# Patient Record
Sex: FEMALE | Race: OTHER | HISPANIC OR LATINO | URBAN - METROPOLITAN AREA
[De-identification: names, ages, dates, MRNs, and addresses within clinical notes are randomized per-mention and may not be internally consistent; named-entity substitution may affect disease eponyms.]

---

## 2023-10-17 VITALS
TEMPERATURE: 99 F | HEIGHT: 59 IN | DIASTOLIC BLOOD PRESSURE: 95 MMHG | WEIGHT: 123.9 LBS | RESPIRATION RATE: 18 BRPM | SYSTOLIC BLOOD PRESSURE: 183 MMHG | HEART RATE: 83 BPM | OXYGEN SATURATION: 100 %

## 2023-10-17 LAB
ALBUMIN SERPL ELPH-MCNC: 4.2 G/DL — SIGNIFICANT CHANGE UP (ref 3.3–5)
ALBUMIN SERPL ELPH-MCNC: 4.2 G/DL — SIGNIFICANT CHANGE UP (ref 3.3–5)
ALP SERPL-CCNC: 85 U/L — SIGNIFICANT CHANGE UP (ref 40–120)
ALP SERPL-CCNC: 85 U/L — SIGNIFICANT CHANGE UP (ref 40–120)
ALT FLD-CCNC: 14 U/L — SIGNIFICANT CHANGE UP (ref 10–45)
ALT FLD-CCNC: 14 U/L — SIGNIFICANT CHANGE UP (ref 10–45)
ANION GAP SERPL CALC-SCNC: 10 MMOL/L — SIGNIFICANT CHANGE UP (ref 5–17)
ANION GAP SERPL CALC-SCNC: 10 MMOL/L — SIGNIFICANT CHANGE UP (ref 5–17)
APTT BLD: 29.4 SEC — SIGNIFICANT CHANGE UP (ref 24.5–35.6)
APTT BLD: 29.4 SEC — SIGNIFICANT CHANGE UP (ref 24.5–35.6)
AST SERPL-CCNC: 22 U/L — SIGNIFICANT CHANGE UP (ref 10–40)
AST SERPL-CCNC: 22 U/L — SIGNIFICANT CHANGE UP (ref 10–40)
BASOPHILS # BLD AUTO: 0.03 K/UL — SIGNIFICANT CHANGE UP (ref 0–0.2)
BASOPHILS # BLD AUTO: 0.03 K/UL — SIGNIFICANT CHANGE UP (ref 0–0.2)
BASOPHILS NFR BLD AUTO: 0.5 % — SIGNIFICANT CHANGE UP (ref 0–2)
BASOPHILS NFR BLD AUTO: 0.5 % — SIGNIFICANT CHANGE UP (ref 0–2)
BILIRUB SERPL-MCNC: 0.2 MG/DL — SIGNIFICANT CHANGE UP (ref 0.2–1.2)
BILIRUB SERPL-MCNC: 0.2 MG/DL — SIGNIFICANT CHANGE UP (ref 0.2–1.2)
BUN SERPL-MCNC: 18 MG/DL — SIGNIFICANT CHANGE UP (ref 7–23)
BUN SERPL-MCNC: 18 MG/DL — SIGNIFICANT CHANGE UP (ref 7–23)
CALCIUM SERPL-MCNC: 9.6 MG/DL — SIGNIFICANT CHANGE UP (ref 8.4–10.5)
CALCIUM SERPL-MCNC: 9.6 MG/DL — SIGNIFICANT CHANGE UP (ref 8.4–10.5)
CHLORIDE SERPL-SCNC: 105 MMOL/L — SIGNIFICANT CHANGE UP (ref 96–108)
CHLORIDE SERPL-SCNC: 105 MMOL/L — SIGNIFICANT CHANGE UP (ref 96–108)
CO2 SERPL-SCNC: 26 MMOL/L — SIGNIFICANT CHANGE UP (ref 22–31)
CO2 SERPL-SCNC: 26 MMOL/L — SIGNIFICANT CHANGE UP (ref 22–31)
CREAT SERPL-MCNC: 1.01 MG/DL — SIGNIFICANT CHANGE UP (ref 0.5–1.3)
CREAT SERPL-MCNC: 1.01 MG/DL — SIGNIFICANT CHANGE UP (ref 0.5–1.3)
EGFR: 56 ML/MIN/1.73M2 — LOW
EGFR: 56 ML/MIN/1.73M2 — LOW
EOSINOPHIL # BLD AUTO: 0.13 K/UL — SIGNIFICANT CHANGE UP (ref 0–0.5)
EOSINOPHIL # BLD AUTO: 0.13 K/UL — SIGNIFICANT CHANGE UP (ref 0–0.5)
EOSINOPHIL NFR BLD AUTO: 2.1 % — SIGNIFICANT CHANGE UP (ref 0–6)
EOSINOPHIL NFR BLD AUTO: 2.1 % — SIGNIFICANT CHANGE UP (ref 0–6)
GLUCOSE SERPL-MCNC: 186 MG/DL — HIGH (ref 70–99)
GLUCOSE SERPL-MCNC: 186 MG/DL — HIGH (ref 70–99)
HCT VFR BLD CALC: 33.7 % — LOW (ref 34.5–45)
HCT VFR BLD CALC: 33.7 % — LOW (ref 34.5–45)
HGB BLD-MCNC: 10.6 G/DL — LOW (ref 11.5–15.5)
HGB BLD-MCNC: 10.6 G/DL — LOW (ref 11.5–15.5)
IMM GRANULOCYTES NFR BLD AUTO: 0.3 % — SIGNIFICANT CHANGE UP (ref 0–0.9)
IMM GRANULOCYTES NFR BLD AUTO: 0.3 % — SIGNIFICANT CHANGE UP (ref 0–0.9)
INR BLD: 0.95 — SIGNIFICANT CHANGE UP (ref 0.85–1.18)
INR BLD: 0.95 — SIGNIFICANT CHANGE UP (ref 0.85–1.18)
LYMPHOCYTES # BLD AUTO: 2.32 K/UL — SIGNIFICANT CHANGE UP (ref 1–3.3)
LYMPHOCYTES # BLD AUTO: 2.32 K/UL — SIGNIFICANT CHANGE UP (ref 1–3.3)
LYMPHOCYTES # BLD AUTO: 36.9 % — SIGNIFICANT CHANGE UP (ref 13–44)
LYMPHOCYTES # BLD AUTO: 36.9 % — SIGNIFICANT CHANGE UP (ref 13–44)
MCHC RBC-ENTMCNC: 27 PG — SIGNIFICANT CHANGE UP (ref 27–34)
MCHC RBC-ENTMCNC: 27 PG — SIGNIFICANT CHANGE UP (ref 27–34)
MCHC RBC-ENTMCNC: 31.5 GM/DL — LOW (ref 32–36)
MCHC RBC-ENTMCNC: 31.5 GM/DL — LOW (ref 32–36)
MCV RBC AUTO: 85.8 FL — SIGNIFICANT CHANGE UP (ref 80–100)
MCV RBC AUTO: 85.8 FL — SIGNIFICANT CHANGE UP (ref 80–100)
MONOCYTES # BLD AUTO: 0.47 K/UL — SIGNIFICANT CHANGE UP (ref 0–0.9)
MONOCYTES # BLD AUTO: 0.47 K/UL — SIGNIFICANT CHANGE UP (ref 0–0.9)
MONOCYTES NFR BLD AUTO: 7.5 % — SIGNIFICANT CHANGE UP (ref 2–14)
MONOCYTES NFR BLD AUTO: 7.5 % — SIGNIFICANT CHANGE UP (ref 2–14)
NEUTROPHILS # BLD AUTO: 3.32 K/UL — SIGNIFICANT CHANGE UP (ref 1.8–7.4)
NEUTROPHILS # BLD AUTO: 3.32 K/UL — SIGNIFICANT CHANGE UP (ref 1.8–7.4)
NEUTROPHILS NFR BLD AUTO: 52.7 % — SIGNIFICANT CHANGE UP (ref 43–77)
NEUTROPHILS NFR BLD AUTO: 52.7 % — SIGNIFICANT CHANGE UP (ref 43–77)
NRBC # BLD: 0 /100 WBCS — SIGNIFICANT CHANGE UP (ref 0–0)
NRBC # BLD: 0 /100 WBCS — SIGNIFICANT CHANGE UP (ref 0–0)
PLATELET # BLD AUTO: 233 K/UL — SIGNIFICANT CHANGE UP (ref 150–400)
PLATELET # BLD AUTO: 233 K/UL — SIGNIFICANT CHANGE UP (ref 150–400)
POTASSIUM SERPL-MCNC: 4.7 MMOL/L — SIGNIFICANT CHANGE UP (ref 3.5–5.3)
POTASSIUM SERPL-MCNC: 4.7 MMOL/L — SIGNIFICANT CHANGE UP (ref 3.5–5.3)
POTASSIUM SERPL-SCNC: 4.7 MMOL/L — SIGNIFICANT CHANGE UP (ref 3.5–5.3)
POTASSIUM SERPL-SCNC: 4.7 MMOL/L — SIGNIFICANT CHANGE UP (ref 3.5–5.3)
PROT SERPL-MCNC: 7.2 G/DL — SIGNIFICANT CHANGE UP (ref 6–8.3)
PROT SERPL-MCNC: 7.2 G/DL — SIGNIFICANT CHANGE UP (ref 6–8.3)
PROTHROM AB SERPL-ACNC: 10.9 SEC — SIGNIFICANT CHANGE UP (ref 9.5–13)
PROTHROM AB SERPL-ACNC: 10.9 SEC — SIGNIFICANT CHANGE UP (ref 9.5–13)
RBC # BLD: 3.93 M/UL — SIGNIFICANT CHANGE UP (ref 3.8–5.2)
RBC # BLD: 3.93 M/UL — SIGNIFICANT CHANGE UP (ref 3.8–5.2)
RBC # FLD: 14.3 % — SIGNIFICANT CHANGE UP (ref 10.3–14.5)
RBC # FLD: 14.3 % — SIGNIFICANT CHANGE UP (ref 10.3–14.5)
SODIUM SERPL-SCNC: 141 MMOL/L — SIGNIFICANT CHANGE UP (ref 135–145)
SODIUM SERPL-SCNC: 141 MMOL/L — SIGNIFICANT CHANGE UP (ref 135–145)
TROPONIN T, HIGH SENSITIVITY RESULT: 7 NG/L — SIGNIFICANT CHANGE UP (ref 0–51)
TROPONIN T, HIGH SENSITIVITY RESULT: 7 NG/L — SIGNIFICANT CHANGE UP (ref 0–51)
WBC # BLD: 6.29 K/UL — SIGNIFICANT CHANGE UP (ref 3.8–10.5)
WBC # BLD: 6.29 K/UL — SIGNIFICANT CHANGE UP (ref 3.8–10.5)
WBC # FLD AUTO: 6.29 K/UL — SIGNIFICANT CHANGE UP (ref 3.8–10.5)
WBC # FLD AUTO: 6.29 K/UL — SIGNIFICANT CHANGE UP (ref 3.8–10.5)

## 2023-10-17 PROCEDURE — 0042T: CPT | Mod: MA

## 2023-10-17 PROCEDURE — 93010 ELECTROCARDIOGRAM REPORT: CPT

## 2023-10-17 PROCEDURE — 70498 CT ANGIOGRAPHY NECK: CPT | Mod: 26,MA

## 2023-10-17 PROCEDURE — 70496 CT ANGIOGRAPHY HEAD: CPT | Mod: 26,MA

## 2023-10-17 PROCEDURE — 70450 CT HEAD/BRAIN W/O DYE: CPT | Mod: 26,MA,59

## 2023-10-17 PROCEDURE — 99285 EMERGENCY DEPT VISIT HI MDM: CPT | Mod: 25

## 2023-10-17 RX ORDER — CLOPIDOGREL BISULFATE 75 MG/1
75 TABLET, FILM COATED ORAL ONCE
Refills: 0 | Status: COMPLETED | OUTPATIENT
Start: 2023-10-17 | End: 2023-10-17

## 2023-10-17 RX ORDER — LABETALOL HCL 100 MG
10 TABLET ORAL ONCE
Refills: 0 | Status: COMPLETED | OUTPATIENT
Start: 2023-10-17 | End: 2023-10-17

## 2023-10-17 RX ORDER — ASPIRIN/CALCIUM CARB/MAGNESIUM 324 MG
81 TABLET ORAL ONCE
Refills: 0 | Status: COMPLETED | OUTPATIENT
Start: 2023-10-17 | End: 2023-10-17

## 2023-10-17 RX ADMIN — CLOPIDOGREL BISULFATE 75 MILLIGRAM(S): 75 TABLET, FILM COATED ORAL at 23:29

## 2023-10-17 RX ADMIN — Medication 10 MILLIGRAM(S): at 23:29

## 2023-10-17 RX ADMIN — Medication 81 MILLIGRAM(S): at 23:28

## 2023-10-17 NOTE — ED ADULT NURSE NOTE - OBJECTIVE STATEMENT
81 yo F c/o of L facial numbness. LWK 1830 on 10/17. Pt reports she was at home in the kitchen standing when her  asked their daughter to come down and check on her. Daughter noticed L sided facial droop. Pt also reports feeling dizzy on and off throughout the day. Pt denies any numbness upon ED arrival. Mild L facial droop noticed. Denies headache, dizziness, blurry vision, numbness or tingling of any of her extremities. R arm drift when NIHSS performed by provider. A&Ox4, steady while ambulatory

## 2023-10-18 ENCOUNTER — INPATIENT (INPATIENT)
Facility: HOSPITAL | Age: 83
LOS: 1 days | Discharge: HOME | DRG: 69 | End: 2023-10-20
Attending: PSYCHIATRY & NEUROLOGY | Admitting: PSYCHIATRY & NEUROLOGY
Payer: MEDICARE

## 2023-10-18 ENCOUNTER — TRANSCRIPTION ENCOUNTER (OUTPATIENT)
Age: 83
End: 2023-10-18

## 2023-10-18 DIAGNOSIS — Z95.2 PRESENCE OF PROSTHETIC HEART VALVE: Chronic | ICD-10-CM

## 2023-10-18 LAB
A1C WITH ESTIMATED AVERAGE GLUCOSE RESULT: 7.9 % — HIGH (ref 4–5.6)
A1C WITH ESTIMATED AVERAGE GLUCOSE RESULT: 7.9 % — HIGH (ref 4–5.6)
ANION GAP SERPL CALC-SCNC: 8 MMOL/L — SIGNIFICANT CHANGE UP (ref 5–17)
ANION GAP SERPL CALC-SCNC: 8 MMOL/L — SIGNIFICANT CHANGE UP (ref 5–17)
BUN SERPL-MCNC: 20 MG/DL — SIGNIFICANT CHANGE UP (ref 7–23)
BUN SERPL-MCNC: 20 MG/DL — SIGNIFICANT CHANGE UP (ref 7–23)
CALCIUM SERPL-MCNC: 9.6 MG/DL — SIGNIFICANT CHANGE UP (ref 8.4–10.5)
CALCIUM SERPL-MCNC: 9.6 MG/DL — SIGNIFICANT CHANGE UP (ref 8.4–10.5)
CHLORIDE SERPL-SCNC: 104 MMOL/L — SIGNIFICANT CHANGE UP (ref 96–108)
CHLORIDE SERPL-SCNC: 104 MMOL/L — SIGNIFICANT CHANGE UP (ref 96–108)
CHOLEST SERPL-MCNC: 136 MG/DL — SIGNIFICANT CHANGE UP
CHOLEST SERPL-MCNC: 136 MG/DL — SIGNIFICANT CHANGE UP
CO2 SERPL-SCNC: 27 MMOL/L — SIGNIFICANT CHANGE UP (ref 22–31)
CO2 SERPL-SCNC: 27 MMOL/L — SIGNIFICANT CHANGE UP (ref 22–31)
CREAT SERPL-MCNC: 0.89 MG/DL — SIGNIFICANT CHANGE UP (ref 0.5–1.3)
CREAT SERPL-MCNC: 0.89 MG/DL — SIGNIFICANT CHANGE UP (ref 0.5–1.3)
EGFR: 66 ML/MIN/1.73M2 — SIGNIFICANT CHANGE UP
EGFR: 66 ML/MIN/1.73M2 — SIGNIFICANT CHANGE UP
ESTIMATED AVERAGE GLUCOSE: 180 MG/DL — HIGH (ref 68–114)
ESTIMATED AVERAGE GLUCOSE: 180 MG/DL — HIGH (ref 68–114)
GLUCOSE BLDC GLUCOMTR-MCNC: 116 MG/DL — HIGH (ref 70–99)
GLUCOSE BLDC GLUCOMTR-MCNC: 116 MG/DL — HIGH (ref 70–99)
GLUCOSE BLDC GLUCOMTR-MCNC: 121 MG/DL — HIGH (ref 70–99)
GLUCOSE BLDC GLUCOMTR-MCNC: 121 MG/DL — HIGH (ref 70–99)
GLUCOSE BLDC GLUCOMTR-MCNC: 137 MG/DL — HIGH (ref 70–99)
GLUCOSE BLDC GLUCOMTR-MCNC: 137 MG/DL — HIGH (ref 70–99)
GLUCOSE BLDC GLUCOMTR-MCNC: 151 MG/DL — HIGH (ref 70–99)
GLUCOSE BLDC GLUCOMTR-MCNC: 151 MG/DL — HIGH (ref 70–99)
GLUCOSE BLDC GLUCOMTR-MCNC: 166 MG/DL — HIGH (ref 70–99)
GLUCOSE BLDC GLUCOMTR-MCNC: 166 MG/DL — HIGH (ref 70–99)
GLUCOSE BLDC GLUCOMTR-MCNC: 213 MG/DL — HIGH (ref 70–99)
GLUCOSE BLDC GLUCOMTR-MCNC: 213 MG/DL — HIGH (ref 70–99)
GLUCOSE BLDC GLUCOMTR-MCNC: 217 MG/DL — HIGH (ref 70–99)
GLUCOSE BLDC GLUCOMTR-MCNC: 217 MG/DL — HIGH (ref 70–99)
GLUCOSE SERPL-MCNC: 125 MG/DL — HIGH (ref 70–99)
GLUCOSE SERPL-MCNC: 125 MG/DL — HIGH (ref 70–99)
HCT VFR BLD CALC: 32.1 % — LOW (ref 34.5–45)
HCT VFR BLD CALC: 32.1 % — LOW (ref 34.5–45)
HDLC SERPL-MCNC: 62 MG/DL — SIGNIFICANT CHANGE UP
HDLC SERPL-MCNC: 62 MG/DL — SIGNIFICANT CHANGE UP
HGB BLD-MCNC: 10.1 G/DL — LOW (ref 11.5–15.5)
HGB BLD-MCNC: 10.1 G/DL — LOW (ref 11.5–15.5)
LIPID PNL WITH DIRECT LDL SERPL: 57 MG/DL — SIGNIFICANT CHANGE UP
LIPID PNL WITH DIRECT LDL SERPL: 57 MG/DL — SIGNIFICANT CHANGE UP
MAGNESIUM SERPL-MCNC: 1.5 MG/DL — LOW (ref 1.6–2.6)
MAGNESIUM SERPL-MCNC: 1.5 MG/DL — LOW (ref 1.6–2.6)
MCHC RBC-ENTMCNC: 26.6 PG — LOW (ref 27–34)
MCHC RBC-ENTMCNC: 26.6 PG — LOW (ref 27–34)
MCHC RBC-ENTMCNC: 31.5 GM/DL — LOW (ref 32–36)
MCHC RBC-ENTMCNC: 31.5 GM/DL — LOW (ref 32–36)
MCV RBC AUTO: 84.5 FL — SIGNIFICANT CHANGE UP (ref 80–100)
MCV RBC AUTO: 84.5 FL — SIGNIFICANT CHANGE UP (ref 80–100)
NON HDL CHOLESTEROL: 74 MG/DL — SIGNIFICANT CHANGE UP
NON HDL CHOLESTEROL: 74 MG/DL — SIGNIFICANT CHANGE UP
NRBC # BLD: 0 /100 WBCS — SIGNIFICANT CHANGE UP (ref 0–0)
NRBC # BLD: 0 /100 WBCS — SIGNIFICANT CHANGE UP (ref 0–0)
PHOSPHATE SERPL-MCNC: 3.9 MG/DL — SIGNIFICANT CHANGE UP (ref 2.5–4.5)
PHOSPHATE SERPL-MCNC: 3.9 MG/DL — SIGNIFICANT CHANGE UP (ref 2.5–4.5)
PLATELET # BLD AUTO: 209 K/UL — SIGNIFICANT CHANGE UP (ref 150–400)
PLATELET # BLD AUTO: 209 K/UL — SIGNIFICANT CHANGE UP (ref 150–400)
POTASSIUM SERPL-MCNC: 4.3 MMOL/L — SIGNIFICANT CHANGE UP (ref 3.5–5.3)
POTASSIUM SERPL-MCNC: 4.3 MMOL/L — SIGNIFICANT CHANGE UP (ref 3.5–5.3)
POTASSIUM SERPL-SCNC: 4.3 MMOL/L — SIGNIFICANT CHANGE UP (ref 3.5–5.3)
POTASSIUM SERPL-SCNC: 4.3 MMOL/L — SIGNIFICANT CHANGE UP (ref 3.5–5.3)
RBC # BLD: 3.8 M/UL — SIGNIFICANT CHANGE UP (ref 3.8–5.2)
RBC # BLD: 3.8 M/UL — SIGNIFICANT CHANGE UP (ref 3.8–5.2)
RBC # FLD: 14.4 % — SIGNIFICANT CHANGE UP (ref 10.3–14.5)
RBC # FLD: 14.4 % — SIGNIFICANT CHANGE UP (ref 10.3–14.5)
SODIUM SERPL-SCNC: 139 MMOL/L — SIGNIFICANT CHANGE UP (ref 135–145)
SODIUM SERPL-SCNC: 139 MMOL/L — SIGNIFICANT CHANGE UP (ref 135–145)
TRIGL SERPL-MCNC: 86 MG/DL — SIGNIFICANT CHANGE UP
TRIGL SERPL-MCNC: 86 MG/DL — SIGNIFICANT CHANGE UP
TSH SERPL-MCNC: 3.34 UIU/ML — SIGNIFICANT CHANGE UP (ref 0.27–4.2)
TSH SERPL-MCNC: 3.34 UIU/ML — SIGNIFICANT CHANGE UP (ref 0.27–4.2)
WBC # BLD: 6.43 K/UL — SIGNIFICANT CHANGE UP (ref 3.8–10.5)
WBC # BLD: 6.43 K/UL — SIGNIFICANT CHANGE UP (ref 3.8–10.5)
WBC # FLD AUTO: 6.43 K/UL — SIGNIFICANT CHANGE UP (ref 3.8–10.5)
WBC # FLD AUTO: 6.43 K/UL — SIGNIFICANT CHANGE UP (ref 3.8–10.5)

## 2023-10-18 PROCEDURE — 99223 1ST HOSP IP/OBS HIGH 75: CPT

## 2023-10-18 PROCEDURE — 71045 X-RAY EXAM CHEST 1 VIEW: CPT | Mod: 26

## 2023-10-18 PROCEDURE — 70450 CT HEAD/BRAIN W/O DYE: CPT | Mod: 26

## 2023-10-18 PROCEDURE — 99222 1ST HOSP IP/OBS MODERATE 55: CPT

## 2023-10-18 RX ORDER — METOPROLOL TARTRATE 50 MG
12.5 TABLET ORAL EVERY 12 HOURS
Refills: 0 | Status: DISCONTINUED | OUTPATIENT
Start: 2023-10-18 | End: 2023-10-19

## 2023-10-18 RX ORDER — ACETAMINOPHEN 500 MG
650 TABLET ORAL EVERY 6 HOURS
Refills: 0 | Status: DISCONTINUED | OUTPATIENT
Start: 2023-10-18 | End: 2023-10-19

## 2023-10-18 RX ORDER — INSULIN GLARGINE 100 [IU]/ML
10 INJECTION, SOLUTION SUBCUTANEOUS
Refills: 0 | DISCHARGE

## 2023-10-18 RX ORDER — DEXTROSE 50 % IN WATER 50 %
12.5 SYRINGE (ML) INTRAVENOUS ONCE
Refills: 0 | Status: DISCONTINUED | OUTPATIENT
Start: 2023-10-18 | End: 2023-10-20

## 2023-10-18 RX ORDER — ASPIRIN/CALCIUM CARB/MAGNESIUM 324 MG
81 TABLET ORAL DAILY
Refills: 0 | Status: DISCONTINUED | OUTPATIENT
Start: 2023-10-19 | End: 2023-10-20

## 2023-10-18 RX ORDER — INSULIN GLARGINE 100 [IU]/ML
5 INJECTION, SOLUTION SUBCUTANEOUS AT BEDTIME
Refills: 0 | Status: DISCONTINUED | OUTPATIENT
Start: 2023-10-18 | End: 2023-10-20

## 2023-10-18 RX ORDER — DEXTROSE 50 % IN WATER 50 %
25 SYRINGE (ML) INTRAVENOUS ONCE
Refills: 0 | Status: DISCONTINUED | OUTPATIENT
Start: 2023-10-18 | End: 2023-10-20

## 2023-10-18 RX ORDER — INSULIN GLARGINE 100 [IU]/ML
5 INJECTION, SOLUTION SUBCUTANEOUS ONCE
Refills: 0 | Status: COMPLETED | OUTPATIENT
Start: 2023-10-18 | End: 2023-10-18

## 2023-10-18 RX ORDER — SODIUM CHLORIDE 9 MG/ML
1000 INJECTION, SOLUTION INTRAVENOUS
Refills: 0 | Status: DISCONTINUED | OUTPATIENT
Start: 2023-10-18 | End: 2023-10-20

## 2023-10-18 RX ORDER — INSULIN LISPRO 100/ML
VIAL (ML) SUBCUTANEOUS
Refills: 0 | Status: DISCONTINUED | OUTPATIENT
Start: 2023-10-18 | End: 2023-10-20

## 2023-10-18 RX ORDER — DEXTROSE 50 % IN WATER 50 %
15 SYRINGE (ML) INTRAVENOUS ONCE
Refills: 0 | Status: DISCONTINUED | OUTPATIENT
Start: 2023-10-18 | End: 2023-10-20

## 2023-10-18 RX ORDER — ENOXAPARIN SODIUM 100 MG/ML
40 INJECTION SUBCUTANEOUS EVERY 24 HOURS
Refills: 0 | Status: DISCONTINUED | OUTPATIENT
Start: 2023-10-18 | End: 2023-10-20

## 2023-10-18 RX ORDER — GLUCAGON INJECTION, SOLUTION 0.5 MG/.1ML
1 INJECTION, SOLUTION SUBCUTANEOUS ONCE
Refills: 0 | Status: DISCONTINUED | OUTPATIENT
Start: 2023-10-18 | End: 2023-10-20

## 2023-10-18 RX ORDER — SITAGLIPTIN 50 MG/1
1 TABLET, FILM COATED ORAL
Refills: 0 | DISCHARGE

## 2023-10-18 RX ORDER — PANTOPRAZOLE SODIUM 20 MG/1
40 TABLET, DELAYED RELEASE ORAL
Refills: 0 | Status: DISCONTINUED | OUTPATIENT
Start: 2023-10-18 | End: 2023-10-20

## 2023-10-18 RX ORDER — ATORVASTATIN CALCIUM 80 MG/1
80 TABLET, FILM COATED ORAL AT BEDTIME
Refills: 0 | Status: DISCONTINUED | OUTPATIENT
Start: 2023-10-18 | End: 2023-10-20

## 2023-10-18 RX ORDER — METFORMIN HYDROCHLORIDE 850 MG/1
1 TABLET ORAL
Refills: 0 | DISCHARGE

## 2023-10-18 RX ORDER — CLOPIDOGREL BISULFATE 75 MG/1
75 TABLET, FILM COATED ORAL DAILY
Refills: 0 | Status: DISCONTINUED | OUTPATIENT
Start: 2023-10-19 | End: 2023-10-20

## 2023-10-18 RX ORDER — LISINOPRIL 2.5 MG/1
10 TABLET ORAL DAILY
Refills: 0 | Status: DISCONTINUED | OUTPATIENT
Start: 2023-10-18 | End: 2023-10-19

## 2023-10-18 RX ADMIN — Medication 4: at 17:38

## 2023-10-18 RX ADMIN — ENOXAPARIN SODIUM 40 MILLIGRAM(S): 100 INJECTION SUBCUTANEOUS at 17:23

## 2023-10-18 RX ADMIN — Medication 650 MILLIGRAM(S): at 23:40

## 2023-10-18 RX ADMIN — ATORVASTATIN CALCIUM 80 MILLIGRAM(S): 80 TABLET, FILM COATED ORAL at 22:42

## 2023-10-18 RX ADMIN — INSULIN GLARGINE 5 UNIT(S): 100 INJECTION, SOLUTION SUBCUTANEOUS at 23:24

## 2023-10-18 RX ADMIN — Medication 12.5 MILLIGRAM(S): at 07:27

## 2023-10-18 RX ADMIN — INSULIN GLARGINE 5 UNIT(S): 100 INJECTION, SOLUTION SUBCUTANEOUS at 03:56

## 2023-10-18 RX ADMIN — LISINOPRIL 10 MILLIGRAM(S): 2.5 TABLET ORAL at 11:59

## 2023-10-18 RX ADMIN — Medication 650 MILLIGRAM(S): at 22:42

## 2023-10-18 RX ADMIN — PANTOPRAZOLE SODIUM 40 MILLIGRAM(S): 20 TABLET, DELAYED RELEASE ORAL at 07:29

## 2023-10-18 RX ADMIN — Medication 12.5 MILLIGRAM(S): at 17:23

## 2023-10-18 RX ADMIN — Medication 2: at 23:23

## 2023-10-18 NOTE — ED ADULT NURSE REASSESSMENT NOTE - NS ED NURSE REASSESS COMMENT FT1
stroke team at bedside. made aware of BP. pending medication orders.
report received from night shift RN. Pt resting comfortably in stretcher, awake and alert, oriented x 4, respirations even and unlabored. continuous cardiac monitoring remains in place. denies facial numbness, weakness, dizziness at this time. updated on plan of care, pending clean bed assignment.
A&Ox4, /81, metoprolol administered.

## 2023-10-18 NOTE — ED PROVIDER NOTE - CLINICAL SUMMARY MEDICAL DECISION MAKING FREE TEXT BOX
Stroke code was called from triage for concern of TIA within 6 hours, taken emergently to CT and evaluated by myself and stroke team.  Vital signs with hypertension, otherwise normal.  Patient to be admitted to stroke team for stroke/TIA work-up, will give aspirin Plavix.  Was given labetalol 10 mg x 1 with improvement in BP.

## 2023-10-18 NOTE — H&P ADULT - HISTORY OF PRESENT ILLNESS
STROKE HPI    HPI: 81yo R hand dominant Female with PMHx of  HTN, HLD, DM, TIA(Unclear), Cardiac Stents X 2 and Aortic valve replacement (Bioprosthetic)in 2021 @ [facility](was on ASA X 1year and was told to D/C ASA in 2022), [facility] BIB family for evaluation of L facial droop, L facial numbness(L V3) noted @ around 1830hrs on 10/17/2023.Per pt,was in the kitchen, getting ready to have dinner and was told by her daughter who came from work that her face appeared "twisted to her left side" and pt c/o L facial numbness and tingling which she describes as " feeling after having dental work done" which lasted X appr 30minutes. Pt felt that her speech was twisted but denies slurred speech or word finding difficulty. Pt also admits to having " cloudy vision" while watching TV earlier on 10/18 that lasted x coupel of minutes. Stroke code was called upon arrival to [facility]. Pt taken to CTs immediately. Stroke code CTH w/ no acute intracranial pathology, CTA H/N w/ no LVO/HGS, mild R prox ICA and moderate L prox ICA stenosis and an incidental 7mm L thyroid nodule and a negative CTP. Pt also admits to being dizzy when attempted to walk her from CT table, states she has been dizzy intermittently since this am which resolved post CT when pt was lying down in bed. Case D/W Dr. Weeks. Given resolved symptoms and unclear LKWT, pt deemed not a candidate for thrombolysis and no EVT 2/2 no LVO.    Ofnote, pt states she has been having vision disturbance X couple of months, described as " cloudy vision" which noticeably happend when she is watching TV/ reading, lasts X couple of seconds and resolves. Also admits to having early morning dizziness which she notices when she wakes up, requiring her to hold onto things to walk which usually resolves in couple of minutes. Has everyday HAs. Denies any dizziness/N/V/HA/paresthesias @ this point of time.    In ER, pt noted john hypertensive with initial SBP @ 183/95, and noted to be @ 223/95 upon rpt VS. S/P Labetolol 10mg IVP X 1 in ER.

## 2023-10-18 NOTE — ED PROVIDER NOTE - OBJECTIVE STATEMENT
82-year-old female with HTN, HLD, DM, prior TIA, CAD stents, bioprosthetic AVR brought in by family member for episode of left-sided facial numbness, facial droop at 6:30 PM.  Daughter said patient's face looked twisted to the left side, patient says she felt like her speech was strange also.  Symptoms lasted 30 minutes and self resolved.

## 2023-10-18 NOTE — DISCHARGE NOTE PROVIDER - HOSPITAL COURSE
Hospital course:  82y Female with PMH of HTN, HLD, DM, TIA, PPM, cardiac stents X 2, AVR(bioprosthetic) in 2021, was on asa for a short period of time(X 1year), not on any AP/AC now, BIB family for eval of transient L facial droop and L facial /V3 numbness that lasted X 30minutes. Also admits to having intermittent episodes of dizziness and cloudy vision that lasts X couple of minutes and has carolyn experiencing that for couple of months. Stroke code called in ER. CTH negative for any acute intracranial pathology, CTA H/N w/mild R prox ICA and mod L prox ICA stenosis, but no LVO/HGS nad an incidental 7mm thyroid nodule which needs outpt follow up. Given unclear LKWT and rapidly resolving symptoms, pt was deemed not a candidate for any acute intervention after d/w Dr. Weeks and admitted for further w/u w/MRI which pt refused as she is claustrophobic. Rpt CTH done on 10/18 which was negative for any acute intracranial pathology. Pt started on DAPT for c/f TIA and will keep her on DAPT X 21 days and then asa alone until follow up w/outpt neurology.      During this hospital course, patient did not have acute stroke on CTH and on rpt CTH. D/C Dx : TIA.  The stroke etiology is likely secondary to:  []atrial fibrillation  []small vessel disease from atherosclerotic risk factors  []other:  [X]etiology workup still in progress    Patient had the following workup done in house:    CT Brain Stroke Protocol (10.17.23 @ 22:50)   IMPRESSION:  No acute intracranial hemorrhage, no mass effect, or no large   transcortical infarction.    CT Angio Brain/Neck Stroke Protocol  w/ IV Cont (10.17.23 @ 22:52)     IMPRESSION:  Intracranial CTA: No hemodynamically significant stenosis, occlusion, or   dissection.  Extracranial CTA: No hemodynamically significant stenosis, occlusion, or   dissection. Mild right proximal ICA and moderate left proximal ICA   stenosis.    CT Brain Perfusion Maps Stroke (10.17.23 @ 22:51)   No perfusion abnormality    CT Head No Cont (10.18.23 @ 16:37)   IMPRESSION:  No hydrocephalus, acute intracranial hemorrhage, mass effect, or brain   edema.  Redemonstration of right mastoid air cell effusion. Correlate for the   presence of mastoiditis.      Physical exam at discharge:  Discharge NIHSS    New medications on discharge: Asa 81mg po daily and plavix 75mg po daily X 21 days and then asa 81mg po daily alone  Labs to be followed up: none  Imaging to be done as outpatient: May need echo  Further outpatient workup:   Hospital course:  82y Female with PMH of HTN, HLD, DM, TIA, PPM, cardiac stents X 2, AVR(bioprosthetic) in 2021, was on asa for a short period of time(X 1year), not on any AP/AC now, BIB family for eval of transient L facial droop and L facial /V3 numbness that lasted X 30minutes. Also admits to having intermittent episodes of dizziness and cloudy vision that lasts X couple of minutes and has carolyn experiencing that for couple of months. Stroke code called in ER. CTH negative for any acute intracranial pathology, CTA H/N w/mild R prox ICA and mod L prox ICA stenosis, but no LVO/HGS nad an incidental 7mm thyroid nodule which needs outpt follow up. Given unclear LKWT and rapidly resolving symptoms, pt was deemed not a candidate for any acute intervention after d/w Dr. Weeks and admitted for further w/u w/MRI which pt refused as she is claustrophobic. Rpt CTH done on 10/18 which was negative for any acute intracranial pathology. Pt started on DAPT for TIA and will keep her on DAPT X 21 days and then asa alone until follow up w/outpt neurology. EP interrogated her pacemaker with no events    During this hospital course, patient did not have acute stroke on CTH and on rpt CTH. D/C Dx : TIA.  The stroke etiology is likely secondary to:  []atrial fibrillation  []small vessel disease from atherosclerotic risk factors  []other:  [X]etiology workup still in progress    Patient had the following workup done in house:    CT Brain Stroke Protocol (10.17.23 @ 22:50)   IMPRESSION:  No acute intracranial hemorrhage, no mass effect, or no large   transcortical infarction.    CT Angio Brain/Neck Stroke Protocol  w/ IV Cont (10.17.23 @ 22:52)     IMPRESSION:  Intracranial CTA: No hemodynamically significant stenosis, occlusion, or   dissection.  Extracranial CTA: No hemodynamically significant stenosis, occlusion, or   dissection. Mild right proximal ICA and moderate left proximal ICA   stenosis.    CT Brain Perfusion Maps Stroke (10.17.23 @ 22:51)   No perfusion abnormality    CT Head No Cont (10.18.23 @ 16:37)   IMPRESSION:  No hydrocephalus, acute intracranial hemorrhage, mass effect, or brain   edema.  Redemonstration of right mastoid air cell effusion. Correlate for the   presence of mastoiditis.    Physical exam at discharge:    Discharge NIHSS    New medications on discharge: Asa 81mg po daily and plavix 75mg po daily X 21 days and then asa 81mg po daily alone  Labs to be followed up: none  Imaging to be done as outpatient: May need echo  Further outpatient workup:   Hospital course:  82y Female with PMH of HTN, HLD, DM, TIA, PPM, cardiac stents X 2, AVR(bioprosthetic) in 2021, was on asa for a short period of time(X 1year), not on any AP/AC now, BIB family for eval of transient L facial droop and L facial /V3 numbness that lasted X 30minutes. Also admits to having intermittent episodes of dizziness and cloudy vision that lasts couple of minutes and has carolyn experiencing that for couple of months. Stroke code called in ER. CTH negative for any acute intracranial pathology, CTA H/N w/mild R prox ICA and mod L prox ICA stenosis, but no LVO/HGS nad an incidental 7mm thyroid nodule which needs outpt follow up. Given unclear LKWT and rapidly resolving symptoms, pt was deemed not a candidate for any acute intervention after d/w Dr. Weeks and admitted for further w/u w/MRI which pt refused as she is claustrophobic. Rpt CTH done on 10/18 which was negative for any acute intracranial pathology. Pt started on DAPT for TIA and will keep her on DAPT X 21 days and then asa alone until follow up w/outpt neurology. EP interrogated her pacemaker with no events    During this hospital course, patient did not have acute stroke on CTH and on rpt CTH. D/C Dx : TIA.  The stroke etiology is likely secondary to:  []atrial fibrillation  []small vessel disease from atherosclerotic risk factors  []other:  [X]etiology workup still in progress    Patient had the following workup done in house:    CT Brain Stroke Protocol (10.17.23 @ 22:50)   IMPRESSION:  No acute intracranial hemorrhage, no mass effect, or no large   transcortical infarction.    CT Angio Brain/Neck Stroke Protocol  w/ IV Cont (10.17.23 @ 22:52)     IMPRESSION:  Intracranial CTA: No hemodynamically significant stenosis, occlusion, or   dissection.  Extracranial CTA: No hemodynamically significant stenosis, occlusion, or   dissection. Mild right proximal ICA and moderate left proximal ICA   stenosis.    CT Brain Perfusion Maps Stroke (10.17.23 @ 22:51)   No perfusion abnormality    CT Head No Cont (10.18.23 @ 16:37)   IMPRESSION:  No hydrocephalus, acute intracranial hemorrhage, mass effect, or brain   edema.  Redemonstration of right mastoid air cell effusion. Correlate for the   presence of mastoiditis.    Physical exam at discharge:  Physical exam:  General: No acute distress, awake and alert    Neurologic:  -Mental status: Awake, alert, oriented to person, place, and time. Speech is fluent with intact naming, repetition, and comprehension, no dysarthria. Recent and remote memory intact. Follows commands. Attention/concentration intact. Fund of knowledge appropriate.  -Cranial nerves:   II: Visual fields are full to confrontation.  III, IV, VI: Extraocular movements are intact without nystagmus. Pupils equally round and reactive to light  VII: Face is symmetric  Motor: Normal bulk and tone. No pronator drift. Strength bilateral upper extremity 5/5, right LE 5/5. Left LE slightly limited by pain with slight drift 4+/5.  Sensation: Intact to light touch bilaterally. No neglect or extinction on double simultaneous testing.  Coordination: No dysmetria on finger-to-nose and heel-to-shin bilaterally  Discharge NIHSS 1    New medications on discharge: Asa 81mg po daily and plavix 75mg po daily X 21 days and then asa 81mg po daily alone  Labs to be followed up: none  Imaging to be done as outpatient: May need echo  Further outpatient workup:   Hospital course:  82y Female with PMH of HTN, HLD, DM, TIA, PPM, cardiac stents X 2, AVR(bioprosthetic) in 2021, was on asa for a short period of time(X 1year), not on any AP/AC now, BIB family for eval of transient L facial droop and L facial /V3 numbness that lasted X 30minutes. Also admits to having intermittent episodes of dizziness and cloudy vision that lasts couple of minutes and has carolyn experiencing that for couple of months. Stroke code called in ER. CTH negative for any acute intracranial pathology, CTA H/N w/mild R prox ICA and mod L prox ICA stenosis, but no LVO/HGS nad an incidental 7mm thyroid nodule which needs outpt follow up. Given unclear LKWT and rapidly resolving symptoms, pt was deemed not a candidate for any acute intervention after d/w Dr. Weeks and admitted for further w/u w/MRI which pt refused as she is claustrophobic. Rpt CTH done on 10/18 which was negative for any acute intracranial pathology. Pt started on DAPT for TIA and will keep her on DAPT X 21 days and then asa alone until follow up w/outpt neurology. EP interrogated her pacemaker with and demonstrates NSR with periods of atrial tachycardia, no AFib detected.     During this hospital course, patient did not have acute stroke on CTH and on rpt CTH. D/C Dx : TIA.  The stroke etiology is likely secondary to:  []atrial fibrillation  []small vessel disease from atherosclerotic risk factors  []other:  [X]etiology workup still in progress    Patient had the following workup done in house:    CT Brain Stroke Protocol (10.17.23 @ 22:50)   IMPRESSION:  No acute intracranial hemorrhage, no mass effect, or no large   transcortical infarction.    CT Angio Brain/Neck Stroke Protocol  w/ IV Cont (10.17.23 @ 22:52)     IMPRESSION:  Intracranial CTA: No hemodynamically significant stenosis, occlusion, or   dissection.  Extracranial CTA: No hemodynamically significant stenosis, occlusion, or   dissection. Mild right proximal ICA and moderate left proximal ICA   stenosis.    CT Brain Perfusion Maps Stroke (10.17.23 @ 22:51)   No perfusion abnormality    CT Head No Cont (10.18.23 @ 16:37)   IMPRESSION:  No hydrocephalus, acute intracranial hemorrhage, mass effect, or brain   edema.  Redemonstration of right mastoid air cell effusion. Correlate for the   presence of mastoiditis.    Physical exam at discharge:  Physical exam:  General: No acute distress, awake and alert    Neurologic:  -Mental status: Awake, alert, oriented to person, place, and time. Speech is fluent with intact naming, repetition, and comprehension, no dysarthria. Recent and remote memory intact. Follows commands. Attention/concentration intact. Fund of knowledge appropriate.  -Cranial nerves:   II: Visual fields are full to confrontation.  III, IV, VI: Extraocular movements are intact without nystagmus. Pupils equally round and reactive to light  VII: Face is symmetric  Motor: Normal bulk and tone. No pronator drift. Strength bilateral upper extremity 5/5, right LE 5/5. Left LE slightly limited by pain with slight drift 4+/5.  Sensation: Intact to light touch bilaterally. No neglect or extinction on double simultaneous testing.  Coordination: No dysmetria on finger-to-nose and heel-to-shin bilaterally  Discharge NIHSS 1    New medications on discharge: Asa 81mg po daily and plavix 75mg po daily X 21 days and then asa 81mg po daily alone  Labs to be followed up: none   Hospital course:  82y Female with PMH of HTN, HLD, DM, TIA, PPM, cardiac stents X 2, AVR(bioprosthetic) in 2021, was on asa for a short period of time(X 1year), not on any AP/AC now, BIB family for eval of transient L facial droop and L facial /V3 numbness that lasted X 30minutes. Also admits to having intermittent episodes of dizziness and cloudy vision that lasts couple of minutes and has carolyn experiencing that for couple of months. Stroke code called in ER. CTH negative for any acute intracranial pathology, CTA H/N w/mild R prox ICA and mod L prox ICA stenosis, but no LVO/HGS nad an incidental 7mm thyroid nodule which needs outpt follow up. Given unclear LKWT and rapidly resolving symptoms, pt was deemed not a candidate for any acute intervention after d/w Dr. Weeks and admitted for further w/u w/MRI which pt refused as she is claustrophobic. Rpt CTH done on 10/18 which was negative for any acute intracranial pathology. Pt started on DAPT for TIA and will keep her on DAPT X 21 days and then asa alone until follow up w/outpt neurology. EP interrogated her pacemaker with and demonstrates NSR with periods of atrial tachycardia, no AFib detected. Echo completed with EF 65-70%, mild LVH, TAVR valve with normal function.       During this hospital course, patient did not have acute stroke on CTH and on rpt CTH. D/C Dx : TIA.  The stroke etiology is likely secondary to:  []atrial fibrillation  []small vessel disease from atherosclerotic risk factors  []other:  [X]etiology workup still in progress    Patient had the following workup done in house:    CT Brain Stroke Protocol (10.17.23 @ 22:50)   IMPRESSION:  No acute intracranial hemorrhage, no mass effect, or no large   transcortical infarction.    CT Angio Brain/Neck Stroke Protocol  w/ IV Cont (10.17.23 @ 22:52)     IMPRESSION:  Intracranial CTA: No hemodynamically significant stenosis, occlusion, or   dissection.  Extracranial CTA: No hemodynamically significant stenosis, occlusion, or   dissection. Mild right proximal ICA and moderate left proximal ICA   stenosis.    CT Brain Perfusion Maps Stroke (10.17.23 @ 22:51)   No perfusion abnormality    CT Head No Cont (10.18.23 @ 16:37)   IMPRESSION:  No hydrocephalus, acute intracranial hemorrhage, mass effect, or brain   edema.  Redemonstration of right mastoid air cell effusion. Correlate for the   presence of mastoiditis.    < from: TTE Echo Complete w/ Contrast w/ Doppler (10.19.23 @ 16:33) >    -----  CONCLUSIONS:     1. Small left ventricular size.   2. Mild symmetric left ventricular hypertrophy.   3. Normal left ventricular systolic function.   4. Normal right ventricular size and systolic function.   5. Normal atria.   6. TAVR valve is seen in the aortic position with apparent normal   function, without evidence of prosthetic dysfunction.   7. Pulmonary artery systolic pressure is 35 mmHg.   8. No pericardial effusion.   9. No prior echo is available for comparison    < end of copied text >    General: No acute distress, awake and alert    Neurologic:  -Mental status: Awake, alert, oriented to person, place, and time. Speech is fluent with intact naming, repetition, and comprehension, no dysarthria. Recent and remote memory intact. Follows commands. Attention/concentration intact. Fund of knowledge appropriate.  -Cranial nerves:   II: Visual fields are full to confrontation.  III, IV, VI: Extraocular movements are intact without nystagmus. Pupils equally round and reactive to light  VII: Face is symmetric  Motor: Normal bulk and tone. No pronator drift. Strength bilateral upper extremity 5/5, right LE 5/5. Left LE slightly limited by pain with slight drift 4+/5.  Sensation: Intact to light touch bilaterally. No neglect or extinction on double simultaneous testing.  Coordination: No dysmetria on finger-to-nose and heel-to-shin bilaterally  Discharge NIHSS 1    New medications on discharge: Asa 81mg po daily and plavix 75mg po daily X 21 days and then asa 81mg po daily alone  Labs to be followed up: none   Hospital course:  82y Female with PMH of HTN, HLD, DM, TIA, PPM, cardiac stents X 2, AVR(bioprosthetic) in 2021, was on asa for a short period of time(X 1year), not on any AP/AC now, BIB family for eval of transient L facial droop and L facial /V3 numbness that lasted X 30minutes. Also admits to having intermittent episodes of dizziness and cloudy vision that lasts couple of minutes and has carloyn experiencing that for couple of months. Stroke code called in ER. CTH negative for any acute intracranial pathology, CTA H/N w/mild R prox ICA and mod L prox ICA stenosis, but no LVO/HGS nad an incidental 7mm thyroid nodule which needs outpt follow up. Given unclear LKWT and rapidly resolving symptoms, pt was deemed not a candidate for any acute intervention after d/w Dr. Weeks and admitted for further w/u w/MRI which pt refused as she is claustrophobic. Rpt CTH done on 10/18 which was negative for any acute intracranial pathology. Pt started on DAPT for TIA and will keep her on DAPT X 21 days and then asa alone until follow up w/outpt neurology. EP interrogated her pacemaker with and demonstrates NSR with periods of atrial tachycardia, no AFib detected. Echo completed with EF 65-70%, mild LVH, TAVR valve with normal function.       During this hospital course, patient did not have acute stroke on CTH and on rpt CTH. D/C Dx : TIA.  The stroke etiology is likely secondary to:  []atrial fibrillation  []small vessel disease from atherosclerotic risk factors  []other:  [X]etiology workup still in progress    Patient had the following workup done in house:    CT Brain Stroke Protocol (10.17.23 @ 22:50)   IMPRESSION:  No acute intracranial hemorrhage, no mass effect, or no large   transcortical infarction.    CT Angio Brain/Neck Stroke Protocol  w/ IV Cont (10.17.23 @ 22:52)     IMPRESSION:  Intracranial CTA: No hemodynamically significant stenosis, occlusion, or   dissection.  Extracranial CTA: No hemodynamically significant stenosis, occlusion, or   dissection. Mild right proximal ICA and moderate left proximal ICA   stenosis.    CT Brain Perfusion Maps Stroke (10.17.23 @ 22:51)   No perfusion abnormality    CT Head No Cont (10.18.23 @ 16:37)   IMPRESSION:  No hydrocephalus, acute intracranial hemorrhage, mass effect, or brain   edema.  Redemonstration of right mastoid air cell effusion. Correlate for the   presence of mastoiditis.    < from: TTE Echo Complete w/ Contrast w/ Doppler (10.19.23 @ 16:33) >    -----  CONCLUSIONS:     1. Small left ventricular size.   2. Mild symmetric left ventricular hypertrophy.   3. Normal left ventricular systolic function.   4. Normal right ventricular size and systolic function.   5. Normal atria.   6. TAVR valve is seen in the aortic position with apparent normal   function, without evidence of prosthetic dysfunction.   7. Pulmonary artery systolic pressure is 35 mmHg.   8. No pericardial effusion.   9. No prior echo is available for comparison    < end of copied text >    General: No acute distress, awake and alert    Neurologic:  -Mental status: Awake, alert, oriented to person, place, and time. Speech is fluent with intact naming, repetition, and comprehension, no dysarthria. Recent and remote memory intact. Follows commands. Attention/concentration intact. Fund of knowledge appropriate.  -Cranial nerves:   II: Visual fields are full to confrontation.  III, IV, VI: Extraocular movements are intact without nystagmus. Pupils equally round and reactive to light  VII: Face is symmetric  Motor: Normal bulk and tone. No pronator drift. Strength bilateral upper extremity 5/5, right LE 5/5. Left LE slightly limited by pain with slight drift 4+/5.  Sensation: Intact to light touch bilaterally. No neglect or extinction on double simultaneous testing.  Coordination: No dysmetria on finger-to-nose and heel-to-shin bilaterally  Discharge NIHSS 1    New medications on discharge: Asa 81mg po daily and plavix 75mg po daily X 21 days and then asa 81mg po daily alone, lisinopril 40mg daily  Labs to be followed up: none

## 2023-10-18 NOTE — H&P ADULT - NSHPSOCIALHISTORY_GEN_ALL_CORE
SOCIAL HISTORY:   Patient lives with spouse  Smoking status: Denies  Drinking: Denies  Drug Use: Denies

## 2023-10-18 NOTE — ED PROVIDER NOTE - PHYSICAL EXAMINATION
CONST: nontoxic NAD speaking in full sentences  HEAD: atraumatic  EYES: conjunctivae clear  NECK: supple  CARD: regular rate  CHEST: breathing comfortably, no stridor/retractions/tripoding  EXT: FROM  SKIN: warm, dry  NEURO: awake alert answering questions following commands moving all extremities no facial droop no gaze preference normal speech. normal strength and sensation

## 2023-10-18 NOTE — DISCHARGE NOTE PROVIDER - CARE PROVIDERS DIRECT ADDRESSES
[email or web address]

## 2023-10-18 NOTE — H&P ADULT - NSHPLABSRESULTS_GEN_ALL_CORE
Fingerstick Blood Glucose: CAPILLARY BLOOD GLUCOSE  191 (17 Oct 2023 22:39)      POCT Blood Glucose.: 191 mg/dL (17 Oct 2023 22:26)    LABS:                        10.6   6.29  )-----------( 233      ( 17 Oct 2023 22:39 )             33.7     10-17    141  |  105  |  18  ----------------------------<  186<H>  4.7   |  26  |  1.01    Ca    9.6      17 Oct 2023 22:39    TPro  7.2  /  Alb  4.2  /  TBili  0.2  /  DBili  x   /  AST  22  /  ALT  14  /  AlkPhos  85  10-17    PT/INR - ( 17 Oct 2023 22:39 )   PT: 10.9 sec;   INR: 0.95          PTT - ( 17 Oct 2023 22:39 )  PTT:29.4 sec      Urinalysis Basic - ( 17 Oct 2023 22:39 )    Color: x / Appearance: x / SG: x / pH: x  Gluc: 186 mg/dL / Ketone: x  / Bili: x / Urobili: x   Blood: x / Protein: x / Nitrite: x   Leuk Esterase: x / RBC: x / WBC x   Sq Epi: x / Non Sq Epi: x / Bacteria: x        RADIOLOGY & ADDITIONAL STUDIES:    CT Brain Stroke Protocol (10.17.23 @ 22:50)     IMPRESSION:  No acute intracranial hemorrhage, no mass effect, or no large   transcortical infarction.      CT Angio Brain/ Neck Stroke Protocol  w/ IV Cont (10.17.23 @ 22:52)     IMPRESSION:    Intracranial CTA: No hemodynamically significant stenosis, occlusion, or   dissection.    Extracranial CTA: No hemodynamically significant stenosis, occlusion, or   dissection. Mild right proximal ICA and moderate left proximal ICA   stenosis.    Incidental 7 mm left thyroid nodule. Visualized lungs unremarkable.   Aortic valve replacement.

## 2023-10-18 NOTE — H&P ADULT - NSHPPHYSICALEXAM_GEN_ALL_CORE
Neurologic:  -Mental status: Awake, alert, oriented to person, place, and time. Speech is fluent with intact naming, repetition, and comprehension, no dysarthria. Recent and remote memory intact. Follows commands. Attention/concentration intact. Fund of knowledge appropriate.    -Cranial nerves:   II: Visual fields are full to confrontation.  III, IV, VI: Extraocular movements are intact without nystagmus. Pupils equally round and reactive to light  V:  Facial sensation V1-V3 equal and intact   VII: Subtle L NLFF disappears upon activation, thou pt states she wears dentures which she did not bring it with her.  VIII: Hearing is bilaterally intact to finger rub  IX, X: Uvula is midline and soft palate rises symmetrically  XI: Head turning and shoulder shrug are intact.  XII: Tongue protrudes midline  Motor: Normal bulk and tone. No pronator drift.  RUE : 5-/5 w/ drift, but does not hit the bed. LUE: 5/5. B/L LE : 5-/5 2/2 knee pain. pain dependant exam B/L LE .   Rapid alternating movements intact and symmetric.  Sensation: Intact to light touch bilaterally. No neglect or extinction on double simultaneous testing.  Coordination: No dysmetria on finger-to-nose.   Reflexes: Downgoing toes bilaterally   Gait: Narrow gait and somewhat  steady.    NIHSS: 2

## 2023-10-18 NOTE — H&P ADULT - NSICDXPASTMEDICALHX_GEN_ALL_CORE_FT
PAST MEDICAL HISTORY:  Brain TIA     Cardiac pacemaker     DM (diabetes mellitus)     HLD (hyperlipidemia)     HTN (hypertension)

## 2023-10-18 NOTE — DISCHARGE NOTE PROVIDER - CARE PROVIDER_API CALL
Emma Weeks  Neurology  [address] [address]  Phone: [phone]  Fax: [phone]  Follow Up Time: 2 weeks   Emma Weeks  Neurology  [address] [address]  Phone: [phone]  Fax: [phone]  Follow Up Time: 2 weeks    Meaghan Cesar NP in Family Health  [address]  [place] [address]  Phone: [phone]  Fax: [phone]  Scheduled Appointment: 11/02/2023 02:00 PM

## 2023-10-18 NOTE — CONSULT NOTE ADULT - ASSESSMENT
82F with PMH of HTN, HLD, AVR, TIA (2 prior), DM2 and pacemaker placement presenting for L sided facial droop and facial numbness.  Admitted to the stroke service for further workup.     #CVA workup  - plan per stroke team   - continue on telemetry monitoring  - brain MRI  - TTE with bubble  - continue aspirin, plavix  - PT consult    #HTN  #HLD  #History of aortic valve replacement  - continue lipitor 80mg  - continue lisinopril and metoprolol at home doses    #DM2  - sliding scale insulin, carb controlled diet  - 5U lantus at bedtime (half of home dose_  - holding januvia and metformin.  Can resume home regimen on discharge.     Moderate level of medical decision making required for this case, including the presence of two chronic medical issues (HTN and HLD) and discussion of plan with the stroke team.

## 2023-10-18 NOTE — CONSULT NOTE ADULT - SUBJECTIVE AND OBJECTIVE BOX
see below for stroke HPI:  "HPI: 81yo R hand dominant Female with PMHx of  HTN, HLD, DM, TIA(Unclear), Cardiac Stents X 2 and Aortic valve replacement (Bioprosthetic)in 2021 @ [facility](was on ASA X 1year and was told to D/C ASA in 2022), [facility] BIB family for evaluation of L facial droop, L facial numbness(L V3) noted @ around 1830hrs on 10/17/2023.Per pt,was in the kitchen, getting ready to have dinner and was told by her daughter who came from work that her face appeared "twisted to her left side" and pt c/o L facial numbness and tingling which she describes as " feeling after having dental work done" which lasted X appr 30minutes. Pt felt that her speech was twisted but denies slurred speech or word finding difficulty. Pt also admits to having " cloudy vision" while watching TV earlier on 10/18 that lasted x coupel of minutes. Stroke code was called upon arrival to [facility] ER. Pt taken to CTs immediately. Stroke code CTH w/ no acute intracranial pathology, CTA H/N w/ no LVO/HGS, mild R prox ICA and moderate L prox ICA stenosis and an incidental 7mm L thyroid nodule and a negative CTP. Pt also admits to being dizzy when attempted to walk her from CT table, states she has been dizzy intermittently since this am which resolved post CT when pt was lying down in bed. Case D/W Dr. Weeks. Given resolved symptoms and unclear LKWT, pt deemed not a candidate for thrombolysis and no EVT 2/2 no LVO.    Ofnote, pt states she has been having vision disturbance X couple of months, described as " cloudy vision" which noticeably happend when she is watching TV/ reading, lasts X couple of seconds and resolves. Also admits to having early morning dizziness which she notices when she wakes up, requiring her to hold onto things to walk which usually resolves in couple of minutes. Has everyday HAs. Denies any dizziness/N/V/HA/paresthesias @ this point of time.    In ER, pt noted john hypertensive with initial SBP @ 183/95, and noted to be @ 223/95 upon rpt VS. S/P Labetolol 10mg IVP X 1 in ER.        Review of Systems   Review of Systems: ROS:   Constitutional: No fever, weight loss or fatigue  Eyes: No eye pain, visual disturbances, or discharge  ENMT:  No difficulty hearing, tinnitus, vertigo; No sinus or throat pain  Neck: No pain or stiffness  Respiratory: No cough, wheezing, chills or hemoptysis  Cardiovascular: No chest pain, palpitations, shortness of breath, dizziness or leg swelling  Gastrointestinal: No abdominal pain. No nausea, vomiting or hematemesis; No diarrhea or constipation. Nohematochezia.  Genitourinary: No dysuria, frequency, hematuria or incontinence  Neurological: As per HPI  Other Review of Systems: All other review of systems negative, except as noted in HPI      Allergies and Intolerances:        Allergies:  	No Known Allergies:     Home Medications:   * Outpatient Medication Status not yet specified    .    Patient History:   Past Medical, Past Surgical, and Family History   PAST MEDICAL HISTORY:  Brain TIA     Cardiac pacemaker     DM (diabetes mellitus)     HLD (hyperlipidemia)     HTN (hypertension).     PAST SURGICAL HISTORY:  S/P AVR (aortic valve replacement).    Social History   · Substance use	No  · Social History (marital status, living situation, occupation, and sexual history)	SOCIAL HISTORY:   Patient lives with spouse  Smoking status: Denies  Drinking: Denies  Drug Use: Denies    Tobacco Screening   · Core Measure Site	No  · Has the patient used tobacco in the past 30 days?	No    Risk Assessment:   Present on Admission   Deep Venous Thrombosis	no  Pulmonary Embolus	no  Urinary Catheter	no  Central Venous Catheter/PICC Line	no  Surgical Site Incision	no  Pressure Ulcer(s)	no"    Vital Signs Last 24 Hrs  T(C): 36.6 (18 Oct 2023 17:18), Max: 37.2 (17 Oct 2023 22:18)  T(F): 97.9 (18 Oct 2023 17:18), Max: 99 (17 Oct 2023 22:18)  HR: 76 (18 Oct 2023 17:18) (70 - 91)  BP: 138/67 (18 Oct 2023 17:18) (138/67 - 223/95)  BP(mean): --  RR: 18 (18 Oct 2023 17:18) (18 - 20)  SpO2: 98% (18 Oct 2023 17:18) (98% - 100%)    Parameters below as of 18 Oct 2023 17:18  Patient On (Oxygen Delivery Method): room air    Physical exam:  General: no acute distress, sitting up in stretcher  HEENT: normocephalic, atraumatic, MMM  Cards: RRR, systolic murmur present  Pulm: CTAB, no wheeze, crackles, rales or rhonchi  Ab: soft, nontender, nondistended, normoactive bowel sounds  Ext: wwp, no asymmetry  Neuro: very slight L nasolabial fold flattening, follows commands, speech is fluent

## 2023-10-18 NOTE — DISCHARGE NOTE PROVIDER - NSDCFUADDAPPT_GEN_ALL_CORE_FT
Please note our office will call you with a follow up appointment.  You will see Dr. Weeks's NP Meaghan in the office as scheduled.    Please follow up with your primary care physician within 1 week of discharge. Please have your thyroid evaluated at that appointment.  You will see Dr. Weeks's NP Meaghan in the office as scheduled.    RESTART METFORMIN ON 10/21. Do not take before then as you received IV contrast and this can increase risk of kidney injury.    Please follow up with your primary care physician within 1 week of discharge. Please have your thyroid evaluated at that appointment.  You will see Dr. Weeks's NP Meaghan in the office as scheduled.    RESTART METFORMIN ON 10/21. Do not take before then as you received IV contrast and this can increase risk of kidney injury.    Please follow up with your primary care physician within 1 week of discharge. Please have your thyroid evaluated at that appointment and high blood pressure.

## 2023-10-18 NOTE — H&P ADULT - NSHPREVIEWOFSYSTEMS_GEN_ALL_CORE
ROS:   Constitutional: No fever, weight loss or fatigue  Eyes: No eye pain, visual disturbances, or discharge  ENMT:  No difficulty hearing, tinnitus, vertigo; No sinus or throat pain  Neck: No pain or stiffness  Respiratory: No cough, wheezing, chills or hemoptysis  Cardiovascular: No chest pain, palpitations, shortness of breath, dizziness or leg swelling  Gastrointestinal: No abdominal pain. No nausea, vomiting or hematemesis; No diarrhea or constipation. Nohematochezia.  Genitourinary: No dysuria, frequency, hematuria or incontinence  Neurological: As per HPI

## 2023-10-18 NOTE — PATIENT PROFILE ADULT - FALL HARM RISK - RISK INTERVENTIONS

## 2023-10-18 NOTE — DISCHARGE NOTE PROVIDER - NSDCCPCAREPLAN_GEN_ALL_CORE_FT
PRINCIPAL DISCHARGE DIAGNOSIS  Diagnosis: Brain TIA  Assessment and Plan of Treatment: You were admitted to the hospital because you had symptoms of L facial droop and numbness , which resolved. This is called a transient ischemic attack, or TIA. This is when a blood clot temporarily blocks a blood vessel in your brain, but does not last long enough to cause permanent damage in your brain. A TIA is a warning sign of a future stroke, which can permanently damage areas in the brain that control parts of the body. It is important to treat a TIA to prevent strokes.   You have these risks factors of TIA and future strokes.  -high blood pressure (also called hypertension)  -diabetes mellitus  -high cholesterol (also called hyperlipidemia)  -heart disease  -still undefined - will continue to be worked up as an outpatient  Please take your [aspirin and plavix for blood thinning and Atorvastatin for cholesterol medication/blood vessel protection as prescribed to prevent further strokes. Do not skip doses and do not run low on your medication. If you run low on your medication, please contact your doctor.  You will follow up outpatient with the stroke Nurse Practitioner/doctor as scheduled below.  Call 911 if you or someone you know experiences the following symptoms of stroke (can be remembered by BE FAST):  •Balance: Dizziness, loss of balance, or a sense of falling  •Eyes: Sudden double vision or blurred vision  •Face: drooping of one side of the face  •Arm: arm weakness  •Speech:  Sudden trouble talking or slurred speech, trouble understanding others  •Time: Time to call for an ambulance fast!

## 2023-10-18 NOTE — DISCHARGE NOTE PROVIDER - NSDCMRMEDTOKEN_GEN_ALL_CORE_FT
atorvastatin 40 mg oral tablet: 1 tab(s) orally once a day (at bedtime)  Basaglar KwikPen 100 units/mL subcutaneous solution: 10 unit(s) subcutaneous once a day (at bedtime)  Januvia 100 mg oral tablet: 1 tab(s) orally once a day  lisinopril 20 mg oral tablet: 1 tab(s) orally once a day  metFORMIN 1000 mg oral tablet: 1 tab(s) orally 2 times a day  metoprolol succinate 50 mg oral tablet, extended release: 1 tab(s) orally once a day  pantoprazole 40 mg oral delayed release tablet: 1 tab(s) orally once a day   aspirin 81 mg oral delayed release tablet: 1 tab(s) orally once a day  atorvastatin 80 mg oral tablet: 1 tab(s) orally once a day (at bedtime)  Basaglar KwikPen 100 units/mL subcutaneous solution: 10 unit(s) subcutaneous once a day (at bedtime)  clopidogrel 75 mg oral tablet: 1 tab(s) orally once a day  Januvia 100 mg oral tablet: 1 tab(s) orally once a day  lisinopril 20 mg oral tablet: 1 tab(s) orally once a day  metFORMIN 1000 mg oral tablet: 1 tab(s) orally 2 times a day  metoprolol succinate 50 mg oral tablet, extended release: 1 tab(s) orally once a day   aspirin 81 mg oral delayed release tablet: 1 tab(s) orally once a day  atorvastatin 80 mg oral tablet: 1 tab(s) orally once a day (at bedtime)  Basaglar KwikPen 100 units/mL subcutaneous solution: 10 unit(s) subcutaneous once a day (at bedtime)  clopidogrel 75 mg oral tablet: 1 tab(s) orally once a day  Januvia 100 mg oral tablet: 1 tab(s) orally once a day  lisinopril 40 mg oral tablet: 1 tab(s) orally once a day  metFORMIN 1000 mg oral tablet: 1 tab(s) orally 2 times a day  metoprolol succinate 50 mg oral tablet, extended release: 1 tab(s) orally once a day

## 2023-10-18 NOTE — DISCHARGE NOTE PROVIDER - PROVIDER TOKENS
PROVIDER:[TOKEN:[[id number]],FOLLOWUP:[2 weeks]] PROVIDER:[TOKEN:[[id number]],FOLLOWUP:[2 weeks]],PROVIDER:[TOKEN:[[id number]],SCHEDULEDAPPT:[11/02/2023],SCHEDULEDAPPTTIME:[02:00 PM]]

## 2023-10-18 NOTE — H&P ADULT - ASSESSMENT
ASSESSMENT/PLAN:    82y Female w/ PMH HTN, HLD, DM, PPM, cardiac stents and bioprosthetic AVR in 2021, presented to ER for eval of L facial droop and L facial (L V3) numbness that lasted X 30minutes and has since resolved. Also admits to having intermittent cloudy vision and dizziness. Denies any dizziness at this point of time. Stroke code imaging largely unrevealing except for CTA w/ B/L prox ICA mild to mod stenosis. Case D/W Dr. Weeks and pt not a candidate for any acute intervention. Admitted to stroke telemetry for further work up.       Neuro  #CVA workup  - S/P ASA 81mg and plavix 75mg po X1 in ER.  - continue aspirin 81mg and plavix 75mg daily  - continue atorvastatin 80mg daily  - q4hr stroke neuro checks and vitals  - obtain MRI Brain without contrast (pt had PPM ).  - Stroke Code HCT Results: Negative  - Stroke Code CTA Results: No LVO/HGS. Mild R prox ICA and Mod L prox ICA stenosis.  - Stroke education    Cards  #HTN  - permissive hypertension, Goal -180  - hold home blood pressure medication for now (Lisinopril 20mg po daily)  - Will continue low dose metoprolol 12.5mg BID to avoid rebound tachycardia (Home dose - Toprol XL 50mg daily).  - obtain TTE with bubble  - Stroke Code EKG Results: F/U official read    #HLD  - high dose statin as above in CVA  - LDL results: pending    Pulm  - call provider if SPO2 < 94%    GI  #Nutrition/Fluids/Electrolytes   - replete K<4 and Mg <2  - Diet: DASH/TLC/CCHO  - IVF: None    Renal  - C/T trend Bun/Crt    Infectious Disease  - Stroke Code CXR results:  F/U official read    Endocrine  #DM  - A1C results: pending  - ISS  - Basaglar 5uQ HS ordered(pts home dose - Basaglar 10u SQ q hs).  - Holding Home Metformin and Januvia    - TSH results:    DVT Prophylaxis  - lovenox sq for DVT prophylaxis   - SCDs for DVT prophylaxis       IDR Goals: Goals reviewed at interdisciplinary rounds with case management, social work, physical therapy, occupational therapy, and speech language pathology.   Please see specific therapy  notes for in depth goals.  Dispo: pending PT/OT eval.     Discussed daily hospital plans and goals with patient and family at bedside.    Discussed with Neurology Attending Dr. Weeks.

## 2023-10-19 PROBLEM — E78.5 HYPERLIPIDEMIA, UNSPECIFIED: Chronic | Status: ACTIVE | Noted: 2023-10-18

## 2023-10-19 PROBLEM — G45.9 TRANSIENT CEREBRAL ISCHEMIC ATTACK, UNSPECIFIED: Chronic | Status: ACTIVE | Noted: 2023-10-18

## 2023-10-19 PROBLEM — E11.9 TYPE 2 DIABETES MELLITUS WITHOUT COMPLICATIONS: Chronic | Status: ACTIVE | Noted: 2023-10-18

## 2023-10-19 PROBLEM — I10 ESSENTIAL (PRIMARY) HYPERTENSION: Chronic | Status: ACTIVE | Noted: 2023-10-18

## 2023-10-19 PROBLEM — Z95.0 PRESENCE OF CARDIAC PACEMAKER: Chronic | Status: ACTIVE | Noted: 2023-10-18

## 2023-10-19 LAB
ANION GAP SERPL CALC-SCNC: 13 MMOL/L — SIGNIFICANT CHANGE UP (ref 5–17)
ANION GAP SERPL CALC-SCNC: 13 MMOL/L — SIGNIFICANT CHANGE UP (ref 5–17)
BUN SERPL-MCNC: 20 MG/DL — SIGNIFICANT CHANGE UP (ref 7–23)
BUN SERPL-MCNC: 20 MG/DL — SIGNIFICANT CHANGE UP (ref 7–23)
CALCIUM SERPL-MCNC: 9.9 MG/DL — SIGNIFICANT CHANGE UP (ref 8.4–10.5)
CALCIUM SERPL-MCNC: 9.9 MG/DL — SIGNIFICANT CHANGE UP (ref 8.4–10.5)
CHLORIDE SERPL-SCNC: 101 MMOL/L — SIGNIFICANT CHANGE UP (ref 96–108)
CHLORIDE SERPL-SCNC: 101 MMOL/L — SIGNIFICANT CHANGE UP (ref 96–108)
CO2 SERPL-SCNC: 23 MMOL/L — SIGNIFICANT CHANGE UP (ref 22–31)
CO2 SERPL-SCNC: 23 MMOL/L — SIGNIFICANT CHANGE UP (ref 22–31)
CREAT SERPL-MCNC: 0.94 MG/DL — SIGNIFICANT CHANGE UP (ref 0.5–1.3)
CREAT SERPL-MCNC: 0.94 MG/DL — SIGNIFICANT CHANGE UP (ref 0.5–1.3)
EGFR: 61 ML/MIN/1.73M2 — SIGNIFICANT CHANGE UP
EGFR: 61 ML/MIN/1.73M2 — SIGNIFICANT CHANGE UP
GLUCOSE BLDC GLUCOMTR-MCNC: 131 MG/DL — HIGH (ref 70–99)
GLUCOSE BLDC GLUCOMTR-MCNC: 131 MG/DL — HIGH (ref 70–99)
GLUCOSE BLDC GLUCOMTR-MCNC: 139 MG/DL — HIGH (ref 70–99)
GLUCOSE BLDC GLUCOMTR-MCNC: 139 MG/DL — HIGH (ref 70–99)
GLUCOSE BLDC GLUCOMTR-MCNC: 140 MG/DL — HIGH (ref 70–99)
GLUCOSE BLDC GLUCOMTR-MCNC: 140 MG/DL — HIGH (ref 70–99)
GLUCOSE BLDC GLUCOMTR-MCNC: 164 MG/DL — HIGH (ref 70–99)
GLUCOSE BLDC GLUCOMTR-MCNC: 164 MG/DL — HIGH (ref 70–99)
GLUCOSE BLDC GLUCOMTR-MCNC: 173 MG/DL — HIGH (ref 70–99)
GLUCOSE BLDC GLUCOMTR-MCNC: 173 MG/DL — HIGH (ref 70–99)
GLUCOSE SERPL-MCNC: 169 MG/DL — HIGH (ref 70–99)
GLUCOSE SERPL-MCNC: 169 MG/DL — HIGH (ref 70–99)
HCT VFR BLD CALC: 35.7 % — SIGNIFICANT CHANGE UP (ref 34.5–45)
HCT VFR BLD CALC: 35.7 % — SIGNIFICANT CHANGE UP (ref 34.5–45)
HGB BLD-MCNC: 11.4 G/DL — LOW (ref 11.5–15.5)
HGB BLD-MCNC: 11.4 G/DL — LOW (ref 11.5–15.5)
MAGNESIUM SERPL-MCNC: 1.6 MG/DL — SIGNIFICANT CHANGE UP (ref 1.6–2.6)
MAGNESIUM SERPL-MCNC: 1.6 MG/DL — SIGNIFICANT CHANGE UP (ref 1.6–2.6)
MCHC RBC-ENTMCNC: 27.1 PG — SIGNIFICANT CHANGE UP (ref 27–34)
MCHC RBC-ENTMCNC: 27.1 PG — SIGNIFICANT CHANGE UP (ref 27–34)
MCHC RBC-ENTMCNC: 31.9 GM/DL — LOW (ref 32–36)
MCHC RBC-ENTMCNC: 31.9 GM/DL — LOW (ref 32–36)
MCV RBC AUTO: 84.8 FL — SIGNIFICANT CHANGE UP (ref 80–100)
MCV RBC AUTO: 84.8 FL — SIGNIFICANT CHANGE UP (ref 80–100)
NRBC # BLD: 0 /100 WBCS — SIGNIFICANT CHANGE UP (ref 0–0)
NRBC # BLD: 0 /100 WBCS — SIGNIFICANT CHANGE UP (ref 0–0)
PHOSPHATE SERPL-MCNC: 2.9 MG/DL — SIGNIFICANT CHANGE UP (ref 2.5–4.5)
PHOSPHATE SERPL-MCNC: 2.9 MG/DL — SIGNIFICANT CHANGE UP (ref 2.5–4.5)
PLATELET # BLD AUTO: 219 K/UL — SIGNIFICANT CHANGE UP (ref 150–400)
PLATELET # BLD AUTO: 219 K/UL — SIGNIFICANT CHANGE UP (ref 150–400)
POTASSIUM SERPL-MCNC: 4.1 MMOL/L — SIGNIFICANT CHANGE UP (ref 3.5–5.3)
POTASSIUM SERPL-MCNC: 4.1 MMOL/L — SIGNIFICANT CHANGE UP (ref 3.5–5.3)
POTASSIUM SERPL-SCNC: 4.1 MMOL/L — SIGNIFICANT CHANGE UP (ref 3.5–5.3)
POTASSIUM SERPL-SCNC: 4.1 MMOL/L — SIGNIFICANT CHANGE UP (ref 3.5–5.3)
RBC # BLD: 4.21 M/UL — SIGNIFICANT CHANGE UP (ref 3.8–5.2)
RBC # BLD: 4.21 M/UL — SIGNIFICANT CHANGE UP (ref 3.8–5.2)
RBC # FLD: 14.4 % — SIGNIFICANT CHANGE UP (ref 10.3–14.5)
RBC # FLD: 14.4 % — SIGNIFICANT CHANGE UP (ref 10.3–14.5)
SODIUM SERPL-SCNC: 137 MMOL/L — SIGNIFICANT CHANGE UP (ref 135–145)
SODIUM SERPL-SCNC: 137 MMOL/L — SIGNIFICANT CHANGE UP (ref 135–145)
TROPONIN T, HIGH SENSITIVITY RESULT: 10 NG/L — SIGNIFICANT CHANGE UP (ref 0–51)
TROPONIN T, HIGH SENSITIVITY RESULT: 10 NG/L — SIGNIFICANT CHANGE UP (ref 0–51)
WBC # BLD: 6.67 K/UL — SIGNIFICANT CHANGE UP (ref 3.8–10.5)
WBC # BLD: 6.67 K/UL — SIGNIFICANT CHANGE UP (ref 3.8–10.5)
WBC # FLD AUTO: 6.67 K/UL — SIGNIFICANT CHANGE UP (ref 3.8–10.5)
WBC # FLD AUTO: 6.67 K/UL — SIGNIFICANT CHANGE UP (ref 3.8–10.5)

## 2023-10-19 PROCEDURE — 99232 SBSQ HOSP IP/OBS MODERATE 35: CPT

## 2023-10-19 PROCEDURE — 93306 TTE W/DOPPLER COMPLETE: CPT | Mod: 26

## 2023-10-19 PROCEDURE — 99238 HOSP IP/OBS DSCHRG MGMT 30/<: CPT

## 2023-10-19 RX ORDER — INFLUENZA VIRUS VACCINE 15; 15; 15; 15 UG/.5ML; UG/.5ML; UG/.5ML; UG/.5ML
0.7 SUSPENSION INTRAMUSCULAR ONCE
Refills: 0 | Status: DISCONTINUED | OUTPATIENT
Start: 2023-10-19 | End: 2023-10-20

## 2023-10-19 RX ORDER — METOPROLOL TARTRATE 50 MG
1 TABLET ORAL
Qty: 0 | Refills: 0 | DISCHARGE
Start: 2023-10-19

## 2023-10-19 RX ORDER — LISINOPRIL 2.5 MG/1
40 TABLET ORAL DAILY
Refills: 0 | Status: DISCONTINUED | OUTPATIENT
Start: 2023-10-20 | End: 2023-10-20

## 2023-10-19 RX ORDER — ATORVASTATIN CALCIUM 80 MG/1
1 TABLET, FILM COATED ORAL
Qty: 30 | Refills: 1
Start: 2023-10-19 | End: 2023-12-17

## 2023-10-19 RX ORDER — CLOPIDOGREL BISULFATE 75 MG/1
1 TABLET, FILM COATED ORAL
Qty: 20 | Refills: 0
Start: 2023-10-19 | End: 2023-11-07

## 2023-10-19 RX ORDER — ATORVASTATIN CALCIUM 80 MG/1
1 TABLET, FILM COATED ORAL
Qty: 30 | Refills: 0
Start: 2023-10-19 | End: 2023-11-17

## 2023-10-19 RX ORDER — METOPROLOL TARTRATE 50 MG
1 TABLET ORAL
Refills: 0 | DISCHARGE

## 2023-10-19 RX ORDER — METOPROLOL TARTRATE 50 MG
50 TABLET ORAL DAILY
Refills: 0 | Status: DISCONTINUED | OUTPATIENT
Start: 2023-10-19 | End: 2023-10-20

## 2023-10-19 RX ORDER — PANTOPRAZOLE SODIUM 20 MG/1
1 TABLET, DELAYED RELEASE ORAL
Refills: 0 | DISCHARGE

## 2023-10-19 RX ORDER — LISINOPRIL 2.5 MG/1
20 TABLET ORAL ONCE
Refills: 0 | Status: COMPLETED | OUTPATIENT
Start: 2023-10-19 | End: 2023-10-19

## 2023-10-19 RX ORDER — ATORVASTATIN CALCIUM 80 MG/1
1 TABLET, FILM COATED ORAL
Refills: 0 | DISCHARGE

## 2023-10-19 RX ORDER — LISINOPRIL 2.5 MG/1
20 TABLET ORAL DAILY
Refills: 0 | Status: DISCONTINUED | OUTPATIENT
Start: 2023-10-19 | End: 2023-10-19

## 2023-10-19 RX ORDER — MAGNESIUM SULFATE 500 MG/ML
2 VIAL (ML) INJECTION ONCE
Refills: 0 | Status: COMPLETED | OUTPATIENT
Start: 2023-10-19 | End: 2023-10-19

## 2023-10-19 RX ORDER — ASPIRIN/CALCIUM CARB/MAGNESIUM 324 MG
1 TABLET ORAL
Qty: 30 | Refills: 1
Start: 2023-10-19 | End: 2023-12-17

## 2023-10-19 RX ORDER — ASPIRIN/CALCIUM CARB/MAGNESIUM 324 MG
1 TABLET ORAL
Qty: 30 | Refills: 0
Start: 2023-10-19 | End: 2023-11-17

## 2023-10-19 RX ADMIN — LISINOPRIL 20 MILLIGRAM(S): 2.5 TABLET ORAL at 19:02

## 2023-10-19 RX ADMIN — Medication 12.5 MILLIGRAM(S): at 06:22

## 2023-10-19 RX ADMIN — LISINOPRIL 20 MILLIGRAM(S): 2.5 TABLET ORAL at 04:27

## 2023-10-19 RX ADMIN — CLOPIDOGREL BISULFATE 75 MILLIGRAM(S): 75 TABLET, FILM COATED ORAL at 12:09

## 2023-10-19 RX ADMIN — Medication 81 MILLIGRAM(S): at 12:09

## 2023-10-19 RX ADMIN — Medication 2: at 22:18

## 2023-10-19 RX ADMIN — ENOXAPARIN SODIUM 40 MILLIGRAM(S): 100 INJECTION SUBCUTANEOUS at 19:03

## 2023-10-19 RX ADMIN — PANTOPRAZOLE SODIUM 40 MILLIGRAM(S): 20 TABLET, DELAYED RELEASE ORAL at 06:22

## 2023-10-19 RX ADMIN — INSULIN GLARGINE 5 UNIT(S): 100 INJECTION, SOLUTION SUBCUTANEOUS at 22:18

## 2023-10-19 RX ADMIN — ATORVASTATIN CALCIUM 80 MILLIGRAM(S): 80 TABLET, FILM COATED ORAL at 22:18

## 2023-10-19 RX ADMIN — Medication 50 MILLIGRAM(S): at 13:10

## 2023-10-19 RX ADMIN — Medication 25 GRAM(S): at 05:47

## 2023-10-19 NOTE — CHART NOTE - NSCHARTNOTEFT_GEN_A_CORE
Called by primary RN that pt was upset, agitated and was pulling of monitor leads. SBP @ 180s and HR @ 110s-130s. COM: V paced. Pt appears visibly upset. Denies any CP/SOB. C/O Palpitations. Pt refused EKG, FSBS and lab draw initially. States she doesn't like this room, wanting to go home or back to ER. Had extensive conversation w/pt, but still refuses care until her daughters are here in the hospital. Per pts daughter, pt has become delirious X 1 during her prior hospitalisation but w/ use of ? narcotics. Explained to pt and family that pt was not given any narcotics. AAO X3, answers appropriately. able to state her name, , place as [facility], month and year. States her hospital is [facility]. No neuro change. Pts daughters @ bedside. Pt appeared much calmer, resting in bed now. HR @ 90s. Will do EKG in am and F/u w/labs. Called by primary RN that pt was upset, agitated and was pulling of monitor leads. SBP @ 180s and HR @ 110s-130s. COM: V paced. Pt appears visibly upset. Denies any CP/SOB. C/O Palpitations. Pt refused EKG, FSBS and lab draw initially. States she doesn't like this room, wanting to go home or back to ER. Had extensive conversation w/pt, but still refuses care until her daughters are here in the hospital. Per pts daughter, pt has become delirious X 1 during her prior hospitalisation but w/ use of ? narcotics. Explained to pt and family that pt was not given any narcotics. AAO X3, answers appropriately. able to state her name, , place as [facility], month and year. States her hospital is [facility]. No neuro change. Pts daughters @ bedside. Pt appeared much calmer, resting in bed now. HR @ 90s. Will do EKG in am and F/u w/labs. Stat EKG done - V pacing. Called by primary RN that pt was upset, agitated and was pulling of monitor leads. SBP @ 180s and HR @ 110s-130s. COM: V paced. Pt appears visibly upset. Denies any CP/SOB. C/O Palpitations. Pt refused EKG, FSBS and lab draw initially. Pt refused IV BP meds as she has been taking more meds than her regular home med. States she doesn't like this room, wanting to go home or back to ER. Had extensive conversation w/pt, but still refuses care until her daughters are here in the hospital. Per pts daughter, pt has become delirious X 1 during her prior hospitalisation but w/ use of ? narcotics. Explained to pt and family that pt was not given any narcotics. AAO X3, answers appropriately. able to state her name, , place as [facility], month and year. States her hospital is [facility]. No neuro change. Pts daughters @ bedside. Pt appeared much calmer, resting in bed now. HR @ 90s. Will do EKG in am and F/u w/labs. Stat EKG done - V pacing.    EKG: pacing w/ LBBB(reviewed by dr. Eli).  Trop neg (10)  Electrolyte WNL. will replete Magnesium(1.6)  Restarted home lisinopril 20mg for HTN.

## 2023-10-19 NOTE — OCCUPATIONAL THERAPY INITIAL EVALUATION ADULT - ADDITIONAL COMMENTS
Pt lives w/ her  in private house w/ x4 stirs to enter. Pt states that she was independent w/o prior use of AEs/DMEs.

## 2023-10-19 NOTE — PHYSICAL THERAPY INITIAL EVALUATION ADULT - PERTINENT HX OF CURRENT PROBLEM, REHAB EVAL
83yo R hand dominant Female with PMHx of  HTN, HLD, DM, TIA(Unclear), Cardiac Stents X 2 and Aortic valve replacement (Bioprosthetic)in 2021 @ [facility](was on ASA X 1year and was told to D/C ASA in 2022), [facility] BIB family for evaluation of L facial droop, L facial numbness(L V3) noted @ around 1830hrs on 10/17/2023.Per pt,was in the kitchen, getting ready to have dinner and was told by her daughter who came from work that her face appeared "twisted to her left side" and pt c/o L facial numbness and tingling which she describes as " feeling after having dental work done" which lasted X appr 30minutes. Pt felt that her speech was twisted but denies slurred speech or word finding difficulty. Pt also admits to having " cloudy vision" while watching TV earlier on 10/18 that lasted x coupel of minutes. Stroke code was called upon arrival to [facility] ER. Pt taken to CTs immediately. Stroke code CTH w/ no acute intracranial pathology, CTA H/N w/ no LVO/HGS, mild R prox ICA and moderate L prox ICA stenosis and an incidental 7mm L thyroid nodule and a negative CTP. Pt also admits to being dizzy when attempted to walk her from CT table, states she has been dizzy intermittently since this am which resolved post CT when pt was lying down in bed. Case D/W Dr. Weeks. Given resolved symptoms and unclear LKWT, pt deemed not a candidate for thrombolysis and no EVT 2/2 no LVO.

## 2023-10-19 NOTE — OCCUPATIONAL THERAPY INITIAL EVALUATION ADULT - GENERAL OBSERVATIONS, REHAB EVAL
Pt's RN Kelsie aware of intent to eval/tx; cleared Pt. Pt received in supine - +telemetry, heplock. Pt's daughters present. Pt w/ good affect, agreeable to OT.  PT Marito TAMAYO. present.

## 2023-10-19 NOTE — CONSULT NOTE ADULT - SUBJECTIVE AND OBJECTIVE BOX
82yF was admitted on 10-18    HPI:  Per initial STROKE HPI "81yo R hand dominant Female with PMHx of  HTN, HLD, DM, TIA(Unclear), Cardiac Stents X 2 and Aortic valve replacement (Bioprosthetic)in 2021 @ [facility](was on ASA X 1year and was told to D/C ASA in 2022), PPM BIB family for evaluation of L facial droop, L facial numbness(L V3) noted @ around 1830hrs on 10/17/2023.Per pt,was in the kitchen, getting ready to have dinner and was told by her daughter who came from work that her face appeared "twisted to her left side" and pt c/o L facial numbness and tingling which she describes as " feeling after having dental work done" which lasted X appr 30minutes. Pt felt that her speech was twisted but denies slurred speech or word finding difficulty. Pt also admits to having " cloudy vision" while watching TV earlier on 10/18 that lasted x coupel of minutes. Stroke code was called upon arrival to [facility] ER. Pt taken to CTs immediately. Stroke code CTH w/ no acute intracranial pathology, CTA H/N w/ no LVO/HGS, mild R prox ICA and moderate L prox ICA stenosis and an incidental 7mm L thyroid nodule and a negative CTP. Pt also admits to being dizzy when attempted to walk her from CT table, states she has been dizzy intermittently since this am which resolved post CT when pt was lying down in bed. Case D/W Dr. Weeks. Given resolved symptoms and unclear LKWT, pt deemed not a candidate for thrombolysis and no EVT 2/2 no LVO.    Ofnote, pt states she has been having vision disturbance X couple of months, described as " cloudy vision" which noticeably happend when she is watching TV/ reading, lasts X couple of seconds and resolves. Also admits to having early morning dizziness which she notices when she wakes up, requiring her to hold onto things to walk which usually resolves in couple of minutes. Has everyday HAs. Denies any dizziness/N/V/HA/paresthesias @ this point of time.    In ER, pt noted john hypertensive with initial SBP @ 183/95, and noted to be @ 223/95 upon rpt VS. S/P Labetolol 10mg IVP X 1 in ER.   (18 Oct 2023 00:46)"    Currently being monitored on telemetry. CVA work up ongoing.  Initial CTH negative  CT head and neck with mild R prox and mod L prox ICA stenosis  TTE with bubble study pending  Refused MRI, pending repeat CTH  Pending PT/OT evaluation    -----------------------------------------------------------------------  REVIEW OF SYSTEMS      -----------------------------------------------------------------------  FUNCTIONAL HISTORY  Lives   Independent    CURRENT FUNCTIONAL STATUS  Pending initial PT/OT evaluations    -----------------------------------------------------------------------  PAST MEDICAL & SURGICAL HISTORY  HTN (hypertension)  HLD (hyperlipidemia)  DM (diabetes mellitus)  Brain TIA  Cardiac pacemaker  S/P AVR (aortic valve replacement)    FAMILY HISTORY       SOCIAL HISTORY  Smoking - Denied  EtOH - Denied   Drugs - Denied  -----------------------------------------------------------------------  VITALS  T(C): 37.3 (10-19-23 @ 09:10), Max: 37.4 (10-18-23 @ 21:36)  HR: 81 (10-19-23 @ 09:17) (72 - 124)  BP: 178/80 (10-19-23 @ 09:17) (138/67 - 196/95)  RR: 18 (10-19-23 @ 09:14) (16 - 24)  SpO2: 100% (10-19-23 @ 09:14) (97% - 100%)  Wt(kg): --    PHYSICAL EXAM        -----------------------------------------------------------------------  RECENT LABS  CBC Full  -  ( 19 Oct 2023 01:29 )  WBC Count : 6.67 K/uL  RBC Count : 4.21 M/uL  Hemoglobin : 11.4 g/dL  Hematocrit : 35.7 %  Platelet Count - Automated : 219 K/uL  Mean Cell Volume : 84.8 fl  Mean Cell Hemoglobin : 27.1 pg  Mean Cell Hemoglobin Concentration : 31.9 gm/dL  Auto Neutrophil # : x  Auto Lymphocyte # : x  Auto Monocyte # : x  Auto Eosinophil # : x  Auto Basophil # : x  Auto Neutrophil % : x  Auto Lymphocyte % : x  Auto Monocyte % : x  Auto Eosinophil % : x  Auto Basophil % : x    10-19    137  |  101  |  20  ----------------------------<  169<H>  4.1   |  23  |  0.94    Ca    9.9      19 Oct 2023 01:29  Phos  2.9     10-19  Mg     1.6     10-19    TPro  7.2  /  Alb  4.2  /  TBili  0.2  /  DBili  x   /  AST  22  /  ALT  14  /  AlkPhos  85  10-17    Urinalysis Basic - ( 19 Oct 2023 01:29 )    Color: x / Appearance: x / SG: x / pH: x  Gluc: 169 mg/dL / Ketone: x  / Bili: x / Urobili: x   Blood: x / Protein: x / Nitrite: x   Leuk Esterase: x / RBC: x / WBC x   Sq Epi: x / Non Sq Epi: x / Bacteria: x        -----------------------------------------------------------------------  IMAGING:    -----------------------------------------------------------------------  ALLERGIES  No Known Allergies      MEDICATIONS   aspirin enteric coated 81 milliGRAM(s) Oral daily  atorvastatin 80 milliGRAM(s) Oral at bedtime  clopidogrel Tablet 75 milliGRAM(s) Oral daily  dextrose 5%. 1000 milliLiter(s) IV Continuous <Continuous>  dextrose 5%. 1000 milliLiter(s) IV Continuous <Continuous>  dextrose 50% Injectable 12.5 Gram(s) IV Push once  dextrose 50% Injectable 25 Gram(s) IV Push once  dextrose 50% Injectable 25 Gram(s) IV Push once  dextrose Oral Gel 15 Gram(s) Oral once PRN  enoxaparin Injectable 40 milliGRAM(s) SubCutaneous every 24 hours  glucagon  Injectable 1 milliGRAM(s) IntraMuscular once  influenza  Vaccine (HIGH DOSE) 0.7 milliLiter(s) IntraMuscular once  insulin glargine Injectable (LANTUS) 5 Unit(s) SubCutaneous at bedtime  insulin lispro (ADMELOG) corrective regimen sliding scale   SubCutaneous Before meals and at bedtime  lisinopril 20 milliGRAM(s) Oral daily  metoprolol succinate ER 50 milliGRAM(s) Oral daily  pantoprazole    Tablet 40 milliGRAM(s) Oral before breakfast    -----------------------------------------------------------------------   82yF was admitted on 10-18    HPI:  Per initial STROKE HPI "83yo R hand dominant Female with PMHx of  HTN, HLD, DM, TIA(Unclear), Cardiac Stents X 2 and Aortic valve replacement (Bioprosthetic)in 2021 @ [facility](was on ASA X 1year and was told to D/C ASA in 2022), PPM BIB family for evaluation of L facial droop, L facial numbness(L V3) noted @ around 1830hrs on 10/17/2023.Per pt,was in the kitchen, getting ready to have dinner and was told by her daughter who came from work that her face appeared "twisted to her left side" and pt c/o L facial numbness and tingling which she describes as " feeling after having dental work done" which lasted X appr 30minutes. Pt felt that her speech was twisted but denies slurred speech or word finding difficulty. Pt also admits to having " cloudy vision" while watching TV earlier on 10/18 that lasted x coupel of minutes. Stroke code was called upon arrival to [facility] ER. Pt taken to CTs immediately. Stroke code CTH w/ no acute intracranial pathology, CTA H/N w/ no LVO/HGS, mild R prox ICA and moderate L prox ICA stenosis and an incidental 7mm L thyroid nodule and a negative CTP. Pt also admits to being dizzy when attempted to walk her from CT table, states she has been dizzy intermittently since this am which resolved post CT when pt was lying down in bed. Case D/W Dr. Weeks. Given resolved symptoms and unclear LKWT, pt deemed not a candidate for thrombolysis and no EVT 2/2 no LVO.    Of note, pt states she has been having vision disturbance X couple of months, described as " cloudy vision" which noticeably happend when she is watching TV/ reading, lasts X couple of seconds and resolves. Also admits to having early morning dizziness which she notices when she wakes up, requiring her to hold onto things to walk which usually resolves in couple of minutes. Has everyday HAs. Denies any dizziness/N/V/HA/paresthesias @ this point of time.    In ER, pt noted john hypertensive with initial SBP @ 183/95, and noted to be @ 223/95 upon rpt VS. S/P Labetolol 10mg IVP X 1 in ER.   (18 Oct 2023 00:46)"    Currently being monitored on telemetry. CVA work up ongoing.  Initial CTH negative  CT head and neck with mild R prox and mod L prox ICA stenosis  TTE with bubble study pending  Refused MRI, pending repeat CTH  PT/OT evaluations compelted  Feels she is functionally at her baseline.    -----------------------------------------------------------------------  REVIEW OF SYSTEMS    Constitutional: No fever, No Chills, No fatigue  HEENT: No eye pain, No visual disturbances, No difficulty hearing  Pulm: No cough,  No shortness of breath  Cardio: No chest pain, No palpitations  GI:  No abdominal pain, No nausea, No vomiting, No diarrhea, No constipation  : No dysuria, No frequency, No hematuria  Neuro: No headaches, No memory loss, No loss of strength, No numbness, No tremors  Skin: No itching, No rashes, No lesions   Endo: No temperature intolerance  MSK: +history of OA in knees   Psych:  No depression, No anxiety   -----------------------------------------------------------------------  FUNCTIONAL HISTORY  Lives in [place], [place] in private two story home with Son and daughter. She lives on first floor with 4 INDU.  Prior to admission she was independent with ADLs and mobility. Does not use ADs    CURRENT FUNCTIONAL STATUS  PT/OT initial evaluations 10/19    Transfer: Sit to Stand:   · Level of [place]	independent    Transfer: Stand to Sit:   · Level of [place]	independent    Transfer: Toilet Transfer:   · Level of [place]	independent    Gait Skills:   · Level of [place]	independent  · Weight-Bearing Restrictions	full weight-bearing  · Gait Distance	200 feet    Gait Analysis:   · Gait Pattern Used	2-point gait  · Impairments Contributing to Gait Deviations	impaired balance      -----------------------------------------------------------------------  PAST MEDICAL & SURGICAL HISTORY  HTN (hypertension)  HLD (hyperlipidemia)  DM (diabetes mellitus)  Brain TIA  Cardiac pacemaker  S/P AVR (aortic valve replacement)    FAMILY HISTORY       SOCIAL HISTORY  Smoking - Denied  EtOH - Denied   Drugs - Denied  -----------------------------------------------------------------------  VITALS  T(C): 37.3 (10-19-23 @ 09:10), Max: 37.4 (10-18-23 @ 21:36)  HR: 81 (10-19-23 @ 09:17) (72 - 124)  BP: 178/80 (10-19-23 @ 09:17) (138/67 - 196/95)  RR: 18 (10-19-23 @ 09:14) (16 - 24)  SpO2: 100% (10-19-23 @ 09:14) (97% - 100%)  Wt(kg): --    PHYSICAL EXAM  Constitutional - NAD, Comfortable  HEENT - NCAT  Neck - Supple, No limited ROM  Chest - Breathing comfortably, No Respiratory distress  Cardiovascular - Regular pulse  Abdomen - No visible abdominal distension  Extremities - No deformities of upper or lower limbs. No calf tenderness   Neurologic Exam -                    Cognitive - Awake, Alert, AAO to self, place, date, year, situation; follows commands     Communication - Fluent, No dysarthria, repetition intact, attention/concentration intact     Cranial Nerves -  EOMI, No facial asymmetry, V1-V3 sensation intact, Tongue midline, EOMI, Shoulder shrug intact     Motor - No focal deficits                    LEFT    UE - ShAB 5/5, EF 5/5, EE 5/5, WE 5/5,  5/5                    LEFT    LE - HF 5/5, KE 5/5, DF 5/5, PF 5/5                    RIGHT UE - ShAB 5/5, EF 5/5, EE 5/5, WE 5/5,  5/5                    RIGHT LE - HF 5/5, KE 5/5, DF 5/5, PF 5/5                       No pronator drift     Sensory - Intact to LT     Reflexes - neg babinski b/l, no clonus     Coordination - FTN intact     OculoVestibular - No nystagmus     Balance - WNL Static  Psychiatric - Mood stable, Affect WNL       -----------------------------------------------------------------------  RECENT LABS  CBC Full  -  ( 19 Oct 2023 01:29 )  WBC Count : 6.67 K/uL  RBC Count : 4.21 M/uL  Hemoglobin : 11.4 g/dL  Hematocrit : 35.7 %  Platelet Count - Automated : 219 K/uL  Mean Cell Volume : 84.8 fl  Mean Cell Hemoglobin : 27.1 pg  Mean Cell Hemoglobin Concentration : 31.9 gm/dL  Auto Neutrophil # : x  Auto Lymphocyte # : x  Auto Monocyte # : x  Auto Eosinophil # : x  Auto Basophil # : x  Auto Neutrophil % : x  Auto Lymphocyte % : x  Auto Monocyte % : x  Auto Eosinophil % : x  Auto Basophil % : x    10-19    137  |  101  |  20  ----------------------------<  169<H>  4.1   |  23  |  0.94    Ca    9.9      19 Oct 2023 01:29  Phos  2.9     10-19  Mg     1.6     10-19    TPro  7.2  /  Alb  4.2  /  TBili  0.2  /  DBili  x   /  AST  22  /  ALT  14  /  AlkPhos  85  10-17    Urinalysis Basic - ( 19 Oct 2023 01:29 )    Color: x / Appearance: x / SG: x / pH: x  Gluc: 169 mg/dL / Ketone: x  / Bili: x / Urobili: x   Blood: x / Protein: x / Nitrite: x   Leuk Esterase: x / RBC: x / WBC x   Sq Epi: x / Non Sq Epi: x / Bacteria: x        -----------------------------------------------------------------------  IMAGING:    -----------------------------------------------------------------------  ALLERGIES  No Known Allergies      MEDICATIONS   aspirin enteric coated 81 milliGRAM(s) Oral daily  atorvastatin 80 milliGRAM(s) Oral at bedtime  clopidogrel Tablet 75 milliGRAM(s) Oral daily  dextrose 5%. 1000 milliLiter(s) IV Continuous <Continuous>  dextrose 5%. 1000 milliLiter(s) IV Continuous <Continuous>  dextrose 50% Injectable 12.5 Gram(s) IV Push once  dextrose 50% Injectable 25 Gram(s) IV Push once  dextrose 50% Injectable 25 Gram(s) IV Push once  dextrose Oral Gel 15 Gram(s) Oral once PRN  enoxaparin Injectable 40 milliGRAM(s) SubCutaneous every 24 hours  glucagon  Injectable 1 milliGRAM(s) IntraMuscular once  influenza  Vaccine (HIGH DOSE) 0.7 milliLiter(s) IntraMuscular once  insulin glargine Injectable (LANTUS) 5 Unit(s) SubCutaneous at bedtime  insulin lispro (ADMELOG) corrective regimen sliding scale   SubCutaneous Before meals and at bedtime  lisinopril 20 milliGRAM(s) Oral daily  metoprolol succinate ER 50 milliGRAM(s) Oral daily  pantoprazole    Tablet 40 milliGRAM(s) Oral before breakfast    -----------------------------------------------------------------------

## 2023-10-19 NOTE — OCCUPATIONAL THERAPY INITIAL EVALUATION ADULT - MD ORDER
L facial droop and L facial (L V3) numbness that lasted X 30minutes and has since resolved  S/P Stroke code  HCT - Negative  CTA Results: No LVO/HGS. Mild R prox ICA and Mod L prox ICA stenosis  Pending Brain MRI and CVA w/u

## 2023-10-19 NOTE — PHYSICAL THERAPY INITIAL EVALUATION ADULT - MODALITIES TREATMENT COMMENTS
Cranial Nerves II - XII: II: PERRLA; visual fields are full to confrontation III, IV, VI: EOMI, no nystagmus appreciated V: facial sensation intact to light touch V1-V3 b/l VII: no ptosis, no facial droop, symmetric eyebrow raise and closure VIII: hearing intact to finger rub b/l  XI: head turning and shoulder shrug intact b/l XII: tongue protrusion midline  Vision: tracking and quadrants intact

## 2023-10-19 NOTE — PHYSICAL THERAPY INITIAL EVALUATION ADULT - ADDITIONAL COMMENTS
Pt was ind pta, pt lives in a house, 4 INDU Pt was ind pta, pt lives in a house, 4 INDU, pt is R handed

## 2023-10-19 NOTE — OCCUPATIONAL THERAPY INITIAL EVALUATION ADULT - PERTINENT HX OF CURRENT PROBLEM, REHAB EVAL
82y Female w/ PMH HTN, HLD, DM, PPM, cardiac stents and bioprosthetic AVR in 2021, presented to ER for eval of L facial droop and L facial (L V3) numbness that lasted X 30minutes and has since resolved. Also admits to having intermittent cloudy vision and dizziness. Denies any dizziness at this point of time. Stroke code imaging largely unrevealing except for CTA w/ B/L prox ICA mild to mod stenosis. Case D/W Dr. Weeks and pt not a candidate for any acute intervention. Admitted to stroke telemetry for further work up.

## 2023-10-19 NOTE — CONSULT NOTE ADULT - ASSESSMENT
82 year old female with PMH of HTN, HLD, AVR, TIA (2 prior), DM2 and pacemaker placement presenting for L sided facial droop and facial numbness, currently admitted for CVA work up.     PROBLEMS / IMPAIRMENTS:  TIA with left sided facial numbess/droop - DAPT  Cardiac history s/p PPM - plan for EP interrogation  HTN    PLAN / RECOMMENDATIONS:     Rehab / Mobilization:   - Initial therapy assessment: [ ] PT  [ ] OT   - Continue skilled therapy while admitted to prevent secondary complications of immobility focus on transfer training, bed mobility, progressive ambulation, and equipment evaluation.   - Educated patient on post-acute rehabilitation. Discussed anticipated rehab course.  - Encourage OOB throughout the day with staff or OOB in chair with meals   - Therapy precautions: falls    Bracing/Splinting:   - None indicated at this time      Pain Management:   - Currently well controlled on current regimen      Speech/ Swallow:   - Passed dysphagia screen  - Diet:  Regular + thins; DASH    GI/ Bowel:  - Continue to monitor bowel patterns.     / Bladder:  - Continue to monitor bladder patterns.      DVT Prophylaxis:   -SCDs, chemoprophylaxis per primary team      Disposition:  Pending PT/OT assessments  - Disposition recommendations pending PT/OT evaluations, activity tolerance, progress with therapy, clinical course.  - Will continue to follow for ongoing rehab needs and recommendations.      82 year old female with PMH of HTN, HLD, AVR, TIA (2 prior), DM2 and pacemaker placement presenting for L sided facial droop and facial numbness, currently admitted for CVA work up.     PROBLEMS / IMPAIRMENTS:  TIA with left sided facial numbess/droop - DAPT  Balance impairments  History of bilateral knee OA  Cardiac history s/p PPM - plan for EP interrogation  HTN    PLAN / RECOMMENDATIONS:     Rehab / Mobilization:   - Initial therapy assessment: [X] PT  [ X] OT   - Continue skilled therapy while admitted to prevent secondary complications of immobility focus on transfer training, bed mobility, progressive ambulation, and equipment evaluation.   - Educated patient on post-acute rehabilitation. Discussed anticipated rehab course.  - Encourage OOB throughout the day with staff or OOB in chair with meals   - Therapy precautions: falls    Bracing/Splinting:   - None indicated at this time      Pain Management:   - Currently well controlled on current regimen      Speech/ Swallow:   - Passed dysphagia screen  - Diet:  Regular + thins; DASH    GI/ Bowel:  - Continue to monitor bowel patterns.     / Bladder:  - Continue to monitor bladder patterns.      DVT Prophylaxis:   -SCDs, chemoprophylaxis per primary team      Disposition:  Home with outpatient PT/OT

## 2023-10-20 ENCOUNTER — TRANSCRIPTION ENCOUNTER (OUTPATIENT)
Age: 83
End: 2023-10-20

## 2023-10-20 VITALS — TEMPERATURE: 97 F

## 2023-10-20 LAB
GLUCOSE BLDC GLUCOMTR-MCNC: 114 MG/DL — HIGH (ref 70–99)
GLUCOSE BLDC GLUCOMTR-MCNC: 114 MG/DL — HIGH (ref 70–99)

## 2023-10-20 PROCEDURE — 99232 SBSQ HOSP IP/OBS MODERATE 35: CPT

## 2023-10-20 RX ORDER — ATORVASTATIN CALCIUM 80 MG/1
1 TABLET, FILM COATED ORAL
Qty: 30 | Refills: 1
Start: 2023-10-20 | End: 2023-12-18

## 2023-10-20 RX ORDER — ASPIRIN/CALCIUM CARB/MAGNESIUM 324 MG
1 TABLET ORAL
Qty: 30 | Refills: 1
Start: 2023-10-20

## 2023-10-20 RX ORDER — LISINOPRIL 2.5 MG/1
1 TABLET ORAL
Qty: 30 | Refills: 1
Start: 2023-10-20

## 2023-10-20 RX ORDER — LISINOPRIL 2.5 MG/1
1 TABLET ORAL
Refills: 0 | DISCHARGE

## 2023-10-20 RX ORDER — CLOPIDOGREL BISULFATE 75 MG/1
1 TABLET, FILM COATED ORAL
Qty: 20 | Refills: 0
Start: 2023-10-20

## 2023-10-20 RX ADMIN — LISINOPRIL 40 MILLIGRAM(S): 2.5 TABLET ORAL at 05:37

## 2023-10-20 RX ADMIN — PANTOPRAZOLE SODIUM 40 MILLIGRAM(S): 20 TABLET, DELAYED RELEASE ORAL at 05:37

## 2023-10-20 RX ADMIN — CLOPIDOGREL BISULFATE 75 MILLIGRAM(S): 75 TABLET, FILM COATED ORAL at 09:57

## 2023-10-20 RX ADMIN — Medication 50 MILLIGRAM(S): at 05:38

## 2023-10-20 RX ADMIN — Medication 81 MILLIGRAM(S): at 09:57

## 2023-10-20 NOTE — PROGRESS NOTE ADULT - SUBJECTIVE AND OBJECTIVE BOX
Electrophysiology Device Interrogation     Indication:  Stroke     Device model: 	[place] Dual-Chamber PPM			                            Functioning Mode: DDD		    Underlying Rhythm:  NSR with some runs of atach     Pacemaker dependency: AP: 2.7%, : 9.2%     Battery status: 8 years     Interrogating parameters:   				RA			RV			  Sense:                                   3.5 mV                           7.8 mV    Threshold:                              0.875 V @  0.5 ms          0.5 V @ 0.5 ms                                                                            Pacing Impedance:               480 ohms                           740 ohms                                                                                                                                                                               Events/Alert: One episode of atrial tachycardia for 3 seconds in 08/2023      Parameter change: 	None      Assessment: Normal functioning ppm. No events on ppm to correlate with episode of stroke-like symptoms.      
Feeling okay today.  Was very anxious about the possibility of getting an MRI yesterday, very claustrophobic, instead went for repeat CT head.  Denies any new symptoms today.   Remaining ROS negative       PHYSICAL EXAM:    General: no acute distress, sitting up in bed  HEENT: normocephalic, atraumatic, MMM  Cards: RRR, systolic murmur present  Pulm: CTAB, no wheeze, crackles, rales or rhonchi  Ab: soft, nontender, nondistended, normoactive bowel sounds  Ext: wwp, no asymmetry  Neuro: very slight L nasolabial fold flattening, follows commands, speech is fluent      VITAL SIGNS:  Vital Signs Last 24 Hrs  T(C): 37.2 (19 Oct 2023 13:32), Max: 37.4 (18 Oct 2023 21:36)  T(F): 98.9 (19 Oct 2023 13:32), Max: 99.4 (18 Oct 2023 21:36)  HR: 82 (19 Oct 2023 12:55) (72 - 124)  BP: 148/67 (19 Oct 2023 12:55) (138/67 - 196/95)  BP(mean): 96 (19 Oct 2023 12:55) (96 - 138)  RR: 18 (19 Oct 2023 12:55) (16 - 24)  SpO2: 99% (19 Oct 2023 12:55) (97% - 100%)    Parameters below as of 19 Oct 2023 12:55  Patient On (Oxygen Delivery Method): room air          MEDICATIONS:  MEDICATIONS  (STANDING):  aspirin enteric coated 81 milliGRAM(s) Oral daily  atorvastatin 80 milliGRAM(s) Oral at bedtime  clopidogrel Tablet 75 milliGRAM(s) Oral daily  dextrose 5%. 1000 milliLiter(s) (50 mL/Hr) IV Continuous <Continuous>  dextrose 5%. 1000 milliLiter(s) (100 mL/Hr) IV Continuous <Continuous>  dextrose 50% Injectable 12.5 Gram(s) IV Push once  dextrose 50% Injectable 25 Gram(s) IV Push once  dextrose 50% Injectable 25 Gram(s) IV Push once  enoxaparin Injectable 40 milliGRAM(s) SubCutaneous every 24 hours  glucagon  Injectable 1 milliGRAM(s) IntraMuscular once  influenza  Vaccine (HIGH DOSE) 0.7 milliLiter(s) IntraMuscular once  insulin glargine Injectable (LANTUS) 5 Unit(s) SubCutaneous at bedtime  insulin lispro (ADMELOG) corrective regimen sliding scale   SubCutaneous Before meals and at bedtime  lisinopril 20 milliGRAM(s) Oral daily  metoprolol succinate ER 50 milliGRAM(s) Oral daily  pantoprazole    Tablet 40 milliGRAM(s) Oral before breakfast    MEDICATIONS  (PRN):  dextrose Oral Gel 15 Gram(s) Oral once PRN Blood Glucose LESS THAN 70 milliGRAM(s)/deciliter      ALLERGIES:  Allergies    No Known Allergies    Intolerances        LABS:                        11.4   6.67  )-----------( 219      ( 19 Oct 2023 01:29 )             35.7     10-19    137  |  101  |  20  ----------------------------<  169<H>  4.1   |  23  |  0.94    Ca    9.9      19 Oct 2023 01:29  Phos  2.9     10-19  Mg     1.6     10-19    TPro  7.2  /  Alb  4.2  /  TBili  0.2  /  DBili  x   /  AST  22  /  ALT  14  /  AlkPhos  85  10-17    PT/INR - ( 17 Oct 2023 22:39 )   PT: 10.9 sec;   INR: 0.95          PTT - ( 17 Oct 2023 22:39 )  PTT:29.4 sec  Urinalysis Basic - ( 19 Oct 2023 01:29 )    Color: x / Appearance: x / SG: x / pH: x  Gluc: 169 mg/dL / Ketone: x  / Bili: x / Urobili: x   Blood: x / Protein: x / Nitrite: x   Leuk Esterase: x / RBC: x / WBC x   Sq Epi: x / Non Sq Epi: x / Bacteria: x      CAPILLARY BLOOD GLUCOSE  191 (17 Oct 2023 22:39)      POCT Blood Glucose.: 131 mg/dL (19 Oct 2023 11:19)      RADIOLOGY & ADDITIONAL TESTS: Reviewed.
No issues overnight.  Discharging today.       Remaining ROS negative     PHYSICAL EXAM:    General: no acute distress, lying in bed  HEENT: NC/AT; MMM  Respiratory: no respiratory distress, no accessory muscle use  Neurological: speech is fluent, no acute deficits noted  Psychiatric: pleasant mood and affect    VITAL SIGNS:  Vital Signs Last 24 Hrs  T(C): 36.1 (20 Oct 2023 10:10), Max: 36.9 (20 Oct 2023 04:00)  T(F): 97 (20 Oct 2023 10:10), Max: 98.4 (20 Oct 2023 04:00)  HR: 67 (20 Oct 2023 09:40) (63 - 81)  BP: 163/72 (20 Oct 2023 09:40) (145/67 - 175/75)  BP(mean): 104 (20 Oct 2023 09:40) (97 - 112)  RR: 18 (20 Oct 2023 09:40) (17 - 19)  SpO2: 97% (20 Oct 2023 09:40) (96% - 97%)    Parameters below as of 20 Oct 2023 09:40  Patient On (Oxygen Delivery Method): room air          MEDICATIONS:  MEDICATIONS  (STANDING):  aspirin enteric coated 81 milliGRAM(s) Oral daily  atorvastatin 80 milliGRAM(s) Oral at bedtime  clopidogrel Tablet 75 milliGRAM(s) Oral daily  dextrose 5%. 1000 milliLiter(s) (50 mL/Hr) IV Continuous <Continuous>  dextrose 5%. 1000 milliLiter(s) (100 mL/Hr) IV Continuous <Continuous>  dextrose 50% Injectable 25 Gram(s) IV Push once  dextrose 50% Injectable 12.5 Gram(s) IV Push once  dextrose 50% Injectable 25 Gram(s) IV Push once  enoxaparin Injectable 40 milliGRAM(s) SubCutaneous every 24 hours  glucagon  Injectable 1 milliGRAM(s) IntraMuscular once  influenza  Vaccine (HIGH DOSE) 0.7 milliLiter(s) IntraMuscular once  insulin glargine Injectable (LANTUS) 5 Unit(s) SubCutaneous at bedtime  insulin lispro (ADMELOG) corrective regimen sliding scale   SubCutaneous Before meals and at bedtime  lisinopril 40 milliGRAM(s) Oral daily  metoprolol succinate ER 50 milliGRAM(s) Oral daily  pantoprazole    Tablet 40 milliGRAM(s) Oral before breakfast    MEDICATIONS  (PRN):  dextrose Oral Gel 15 Gram(s) Oral once PRN Blood Glucose LESS THAN 70 milliGRAM(s)/deciliter      ALLERGIES:  Allergies    No Known Allergies    Intolerances        LABS:                        11.4   6.67  )-----------( 219      ( 19 Oct 2023 01:29 )             35.7     10-19    137  |  101  |  20  ----------------------------<  169<H>  4.1   |  23  |  0.94    Ca    9.9      19 Oct 2023 01:29  Phos  2.9     10-19  Mg     1.6     10-19        Urinalysis Basic - ( 19 Oct 2023 01:29 )    Color: x / Appearance: x / SG: x / pH: x  Gluc: 169 mg/dL / Ketone: x  / Bili: x / Urobili: x   Blood: x / Protein: x / Nitrite: x   Leuk Esterase: x / RBC: x / WBC x   Sq Epi: x / Non Sq Epi: x / Bacteria: x      CAPILLARY BLOOD GLUCOSE      POCT Blood Glucose.: 114 mg/dL (20 Oct 2023 06:44)      RADIOLOGY & ADDITIONAL TESTS: Reviewed.

## 2023-10-20 NOTE — DISCHARGE NOTE NURSING/CASE MANAGEMENT/SOCIAL WORK - NSDCFUADDAPPT_GEN_ALL_CORE_FT
You will see Dr. Weeks's NP Meaghan in the office as scheduled.    RESTART METFORMIN ON 10/21. Do not take before then as you received IV contrast and this can increase risk of kidney injury.    Please follow up with your primary care physician within 1 week of discharge. Please have your thyroid evaluated at that appointment and high blood pressure.

## 2023-10-20 NOTE — DISCHARGE NOTE NURSING/CASE MANAGEMENT/SOCIAL WORK - PATIENT PORTAL LINK FT
You can access the FollowMyHealth Patient Portal offered by [facility] by registering at the following website: [email or web address]. By joining [facility]’s FollowMyHealth portal, you will also be able to view your health information using other applications (apps) compatible with our system.

## 2023-10-20 NOTE — PROGRESS NOTE ADULT - REASON FOR ADMISSION
Stroke w/u for L facial droop and Numbness

## 2023-10-20 NOTE — PROGRESS NOTE ADULT - ASSESSMENT
82F with PMH of HTN, HLD, AVR, TIA (2 prior), DM2 and pacemaker placement presenting for L sided facial droop and facial numbness.  Admitted to the stroke service for further workup.     #CVA workup  - plan per stroke team   - continue on telemetry monitoring  - brain MRI - declined, repeat CT head without any acute stroke.  ?Redemonstration of right mastoid air cell effusion. On head CT.  No fever, chills, leukocytosis.  Recommend outpatient follow up. No prior imaging.    - recently had echo outpatient - obtain records  - continue aspirin, plavix  - PT consult    #HTN  #HLD  #History of aortic valve replacement  - continue lipitor 80mg  - increase lisinopril today and metoprolol at home doses    #DM2  - sliding scale insulin, carb controlled diet  - 5U lantus at bedtime (half of home dose_  - holding januvia and metformin.  Can resume home regimen on discharge. Need to wait 72 hours after getting IV contrast before restarting metformin.     Moderate level of medical decision making required for this case, including the presence of two chronic medical issues (HTN and HLD) and discussion of plan with the stroke team.   
82F with PMH of HTN, HLD, AVR, TIA (2 prior), DM2 and pacemaker placement presenting for L sided facial droop and facial numbness.  Admitted to the stroke service for further workup.     #CVA workup  - plan per stroke team   - continue on telemetry monitoring  - brain MRI - declined, repeat CT head without any acute stroke.  ?Redemonstration of right mastoid air cell effusion. On head CT.  No fever, chills, leukocytosis.  Recommend outpatient follow up. No prior imaging.    - recently had echo outpatient - obtain records  - continue aspirin, plavix  - PT consult    #HTN  #HLD  #History of aortic valve replacement  - continue lipitor 80mg  - continue lisinopril and metoprolol at home doses    #DM2  - sliding scale insulin, carb controlled diet  - 5U lantus at bedtime (half of home dose_  - holding januvia and metformin.  Can resume home regimen on discharge. Need to wait 72 hours after getting IV contrast before restarting metformin.     Moderate level of medical decision making required for this case, including the presence of two chronic medical issues (HTN and HLD) and discussion of plan with the stroke team.

## 2023-10-25 DIAGNOSIS — F40.240 CLAUSTROPHOBIA: ICD-10-CM

## 2023-10-25 DIAGNOSIS — E78.5 HYPERLIPIDEMIA, UNSPECIFIED: ICD-10-CM

## 2023-10-25 DIAGNOSIS — E83.42 HYPOMAGNESEMIA: ICD-10-CM

## 2023-10-25 DIAGNOSIS — M17.0 BILATERAL PRIMARY OSTEOARTHRITIS OF KNEE: ICD-10-CM

## 2023-10-25 DIAGNOSIS — H70.90 UNSPECIFIED MASTOIDITIS, UNSPECIFIED EAR: ICD-10-CM

## 2023-10-25 DIAGNOSIS — Z95.0 PRESENCE OF CARDIAC PACEMAKER: ICD-10-CM

## 2023-10-25 DIAGNOSIS — E11.9 TYPE 2 DIABETES MELLITUS WITHOUT COMPLICATIONS: ICD-10-CM

## 2023-10-25 DIAGNOSIS — I10 ESSENTIAL (PRIMARY) HYPERTENSION: ICD-10-CM

## 2023-10-25 DIAGNOSIS — Z95.5 PRESENCE OF CORONARY ANGIOPLASTY IMPLANT AND GRAFT: ICD-10-CM

## 2023-10-25 DIAGNOSIS — Z86.73 PERSONAL HISTORY OF TRANSIENT ISCHEMIC ATTACK (TIA), AND CEREBRAL INFARCTION WITHOUT RESIDUAL DEFICITS: ICD-10-CM

## 2023-10-25 DIAGNOSIS — Z79.84 LONG TERM (CURRENT) USE OF ORAL HYPOGLYCEMIC DRUGS: ICD-10-CM

## 2023-10-25 DIAGNOSIS — R29.810 FACIAL WEAKNESS: ICD-10-CM

## 2023-10-25 DIAGNOSIS — I47.19 OTHER SUPRAVENTRICULAR TACHYCARDIA: ICD-10-CM

## 2023-10-25 DIAGNOSIS — I25.10 ATHEROSCLEROTIC HEART DISEASE OF NATIVE CORONARY ARTERY WITHOUT ANGINA PECTORIS: ICD-10-CM

## 2023-10-25 DIAGNOSIS — G45.9 TRANSIENT CEREBRAL ISCHEMIC ATTACK, UNSPECIFIED: ICD-10-CM

## 2023-10-25 DIAGNOSIS — Z95.2 PRESENCE OF PROSTHETIC HEART VALVE: ICD-10-CM

## 2023-10-25 DIAGNOSIS — F41.9 ANXIETY DISORDER, UNSPECIFIED: ICD-10-CM

## 2023-10-25 DIAGNOSIS — Z53.29 PROCEDURE AND TREATMENT NOT CARRIED OUT BECAUSE OF PATIENT'S DECISION FOR OTHER REASONS: ICD-10-CM

## 2023-10-25 DIAGNOSIS — I44.7 LEFT BUNDLE-BRANCH BLOCK, UNSPECIFIED: ICD-10-CM

## 2023-10-26 PROBLEM — Z00.00 ENCOUNTER FOR PREVENTIVE HEALTH EXAMINATION: Status: ACTIVE | Noted: 2023-10-26

## 2023-11-02 ENCOUNTER — APPOINTMENT (OUTPATIENT)
Dept: NEUROLOGY | Facility: CLINIC | Age: 83
End: 2023-11-02
Payer: MEDICARE

## 2023-11-02 VITALS
BODY MASS INDEX: 25.2 KG/M2 | HEIGHT: 59 IN | HEART RATE: 92 BPM | WEIGHT: 125 LBS | DIASTOLIC BLOOD PRESSURE: 78 MMHG | SYSTOLIC BLOOD PRESSURE: 153 MMHG | OXYGEN SATURATION: 95 % | TEMPERATURE: 96.3 F

## 2023-11-02 DIAGNOSIS — G45.9 TRANSIENT CEREBRAL ISCHEMIC ATTACK, UNSPECIFIED: ICD-10-CM

## 2023-11-02 PROCEDURE — 99205 OFFICE O/P NEW HI 60 MIN: CPT

## 2023-11-02 RX ORDER — SITAGLIPTIN 100 MG/1
100 TABLET, FILM COATED ORAL DAILY
Refills: 0 | Status: ACTIVE | COMMUNITY

## 2023-11-02 RX ORDER — ATORVASTATIN CALCIUM 80 MG/1
80 TABLET, FILM COATED ORAL DAILY
Refills: 0 | Status: DISCONTINUED | COMMUNITY
End: 2023-11-02

## 2023-11-02 RX ORDER — CLOPIDOGREL BISULFATE 75 MG/1
75 TABLET, FILM COATED ORAL
Qty: 90 | Refills: 3 | Status: ACTIVE | COMMUNITY

## 2023-11-02 RX ORDER — METOPROLOL SUCCINATE 50 MG/1
50 TABLET, EXTENDED RELEASE ORAL DAILY
Refills: 0 | Status: ACTIVE | COMMUNITY

## 2023-11-02 RX ORDER — ATORVASTATIN CALCIUM 40 MG/1
40 TABLET, FILM COATED ORAL
Qty: 90 | Refills: 1 | Status: ACTIVE | COMMUNITY
Start: 2023-11-02 | End: 1900-01-01

## 2023-11-02 RX ORDER — METFORMIN HYDROCHLORIDE 1000 MG/1
1000 TABLET, COATED ORAL TWICE DAILY
Refills: 0 | Status: ACTIVE | COMMUNITY

## 2023-11-02 RX ORDER — LISINOPRIL 40 MG/1
40 TABLET ORAL DAILY
Refills: 0 | Status: ACTIVE | COMMUNITY

## 2023-11-02 RX ORDER — INSULIN GLARGINE 100 [IU]/ML
100 INJECTION, SOLUTION SUBCUTANEOUS
Refills: 0 | Status: ACTIVE | COMMUNITY

## 2023-11-13 PROCEDURE — 83036 HEMOGLOBIN GLYCOSYLATED A1C: CPT

## 2023-11-13 PROCEDURE — 82962 GLUCOSE BLOOD TEST: CPT

## 2023-11-13 PROCEDURE — 80053 COMPREHEN METABOLIC PANEL: CPT

## 2023-11-13 PROCEDURE — 70496 CT ANGIOGRAPHY HEAD: CPT | Mod: MA

## 2023-11-13 PROCEDURE — 93005 ELECTROCARDIOGRAM TRACING: CPT

## 2023-11-13 PROCEDURE — 70498 CT ANGIOGRAPHY NECK: CPT | Mod: MA

## 2023-11-13 PROCEDURE — 80061 LIPID PANEL: CPT

## 2023-11-13 PROCEDURE — 0042T: CPT | Mod: MA

## 2023-11-13 PROCEDURE — 83735 ASSAY OF MAGNESIUM: CPT

## 2023-11-13 PROCEDURE — 99285 EMERGENCY DEPT VISIT HI MDM: CPT | Mod: 25

## 2023-11-13 PROCEDURE — 85610 PROTHROMBIN TIME: CPT

## 2023-11-13 PROCEDURE — 97161 PT EVAL LOW COMPLEX 20 MIN: CPT

## 2023-11-13 PROCEDURE — 97165 OT EVAL LOW COMPLEX 30 MIN: CPT

## 2023-11-13 PROCEDURE — 71045 X-RAY EXAM CHEST 1 VIEW: CPT

## 2023-11-13 PROCEDURE — 85730 THROMBOPLASTIN TIME PARTIAL: CPT

## 2023-11-13 PROCEDURE — 84100 ASSAY OF PHOSPHORUS: CPT

## 2023-11-13 PROCEDURE — 85025 COMPLETE CBC W/AUTO DIFF WBC: CPT

## 2023-11-13 PROCEDURE — 70450 CT HEAD/BRAIN W/O DYE: CPT | Mod: MA

## 2023-11-13 PROCEDURE — 85027 COMPLETE CBC AUTOMATED: CPT

## 2023-11-13 PROCEDURE — 96374 THER/PROPH/DIAG INJ IV PUSH: CPT

## 2023-11-13 PROCEDURE — 36415 COLL VENOUS BLD VENIPUNCTURE: CPT

## 2023-11-13 PROCEDURE — 80048 BASIC METABOLIC PNL TOTAL CA: CPT

## 2023-11-13 PROCEDURE — 84484 ASSAY OF TROPONIN QUANT: CPT

## 2023-11-13 PROCEDURE — 84443 ASSAY THYROID STIM HORMONE: CPT

## 2023-11-13 PROCEDURE — C8929: CPT

## 2023-12-15 NOTE — PHYSICAL THERAPY INITIAL EVALUATION ADULT - LEVEL OF CONSCIOUSNESS, REHAB EVAL
cfr panel--negative  Stop otc cold cough medications, they might increase your blood pressure. Okay to use eat/drink bitter melon for coughing  Please take medications as directed.  Supportive care  and monitoring without antibiotics, and Follow up with your PCp or here if not better in 1-2 weeks or sooner if worse and  Go to ER if it is needed    alert

## 2024-07-22 ENCOUNTER — RX RENEWAL (OUTPATIENT)
Age: 84
End: 2024-07-22

## 2024-10-21 ENCOUNTER — RX RENEWAL (OUTPATIENT)
Age: 84
End: 2024-10-21

## 2025-04-02 NOTE — H&P ADULT - NIH STROKE SCALE: 6B. MOTOR LEG, RIGHT, QM
Called pt for 2 week follow-up from initial [facility] appt. Pt stated she \"is doing ok.\" States she is walking ok but get SOB easily. Denies any wt gain or LE edema. Reinforced 2000 mg low sodium diet and 64 oz fluid restriction. Pt stated \"I'm trying. I have to eat up what I have at home first.\" Encouraged dietary restrictions and daily wt. Reminded of next appt 4/16/25.  
Correction addendum-pt was called for 1 week follow-up phone call, not 2 week  
(0) No drift; leg holds 30 degree position for full 5 secs

## 2025-06-09 NOTE — PHYSICAL THERAPY INITIAL EVALUATION ADULT - GROSSLY INTACT, SENSORY
Recent PHQ 2/9 Score    PHQ 2:  PHQ 2 Score Adult PHQ 2 Score Adult PHQ 2 Interpretation Little interest or pleasure in activity?   6/9/2025   2:22 PM 2 No further screening needed 1       PHQ 9:  PHQ 9 Score Adult PHQ 9 Score Adult PHQ 9 Interpretation   5/16/2022   2:42 PM 11 Moderate Depression        Grossly Intact